# Patient Record
Sex: FEMALE | Race: WHITE | NOT HISPANIC OR LATINO
[De-identification: names, ages, dates, MRNs, and addresses within clinical notes are randomized per-mention and may not be internally consistent; named-entity substitution may affect disease eponyms.]

---

## 2017-01-06 ENCOUNTER — APPOINTMENT (OUTPATIENT)
Dept: OPHTHALMOLOGY | Facility: CLINIC | Age: 62
End: 2017-01-06

## 2017-01-06 DIAGNOSIS — H25.12 AGE-RELATED NUCLEAR CATARACT, LEFT EYE: ICD-10-CM

## 2017-01-06 DIAGNOSIS — H25.11 AGE-RELATED NUCLEAR CATARACT, RIGHT EYE: ICD-10-CM

## 2018-05-11 ENCOUNTER — APPOINTMENT (OUTPATIENT)
Dept: OPHTHALMOLOGY | Facility: CLINIC | Age: 63
End: 2018-05-11

## 2018-12-07 ENCOUNTER — APPOINTMENT (OUTPATIENT)
Dept: OPHTHALMOLOGY | Facility: CLINIC | Age: 63
End: 2018-12-07
Payer: COMMERCIAL

## 2018-12-07 DIAGNOSIS — H25.13 AGE-RELATED NUCLEAR CATARACT, BILATERAL: ICD-10-CM

## 2018-12-07 DIAGNOSIS — H01.00A UNSPECIFIED BLEPHARITIS RIGHT EYE, UPPER AND LOWER EYELIDS: ICD-10-CM

## 2018-12-07 DIAGNOSIS — H01.00B UNSPECIFIED BLEPHARITIS RIGHT EYE, UPPER AND LOWER EYELIDS: ICD-10-CM

## 2018-12-07 PROCEDURE — 92014 COMPRE OPH EXAM EST PT 1/>: CPT

## 2019-02-22 ENCOUNTER — APPOINTMENT (OUTPATIENT)
Dept: OTOLARYNGOLOGY | Facility: CLINIC | Age: 64
End: 2019-02-22
Payer: COMMERCIAL

## 2019-02-22 VITALS
SYSTOLIC BLOOD PRESSURE: 123 MMHG | HEART RATE: 69 BPM | DIASTOLIC BLOOD PRESSURE: 81 MMHG | HEIGHT: 65 IN | WEIGHT: 152 LBS | BODY MASS INDEX: 25.33 KG/M2

## 2019-02-22 PROCEDURE — 31231 NASAL ENDOSCOPY DX: CPT

## 2019-02-22 PROCEDURE — 99204 OFFICE O/P NEW MOD 45 MIN: CPT | Mod: 25

## 2019-02-22 NOTE — HISTORY OF PRESENT ILLNESS
[de-identified] : PAtient has had nasal congestion and some coughing for the last 11 days and she had random throat pain last week that was minor. She has mucus in the throat that is clear when she blows her nose the mucus is clear. She stopped using flonase a day ago. SHe had minor improvement with the mucus in the throat when she used the flonase. SHe does not have any issues with sinus infections recently but she did have sinus surgery years ago. The last CT of the sinuses was probably 2 years ago and she believes they have been fine in regards to the sinuses.

## 2019-02-22 NOTE — CONSULT LETTER
[Dear  ___] : Dear  [unfilled], [Consult Letter:] : I had the pleasure of evaluating your patient, [unfilled]. [Please see my note below.] : Please see my note below. [Consult Closing:] : Thank you very much for allowing me to participate in the care of this patient.  If you have any questions, please do not hesitate to contact me. [Sincerely,] : Sincerely, [FreeTextEntry3] : Silver Mcdermott MD\par [facility]\par Otolaryngology and Facial Plastics\par Associated Professor, [employer]\par

## 2019-02-22 NOTE — PHYSICAL EXAM
[Nasal Endoscopy Performed] : nasal endoscopy was performed, see procedure section for findings [] : septum deviated to the left [Midline] : trachea located in midline position [Normal] : no rashes [Removed] : palatine tonsils previously removed

## 2019-02-22 NOTE — ASSESSMENT
[FreeTextEntry1] : Patient history recurrent sinus issues operated in 2012 on the left side every February gets an upper respiratory tract infection that tract into [place] bronch[place] endosc[place] of her left maxillary sinuses and ethmoids a wide-open R. right unable to enter she does have a deviated septum a lot of secretions based on history we put on a Medrol Dosepak and Zithromax and sent her for a CAT scan to definitively evaluate her sinuses.

## 2019-03-12 ENCOUNTER — FORM ENCOUNTER (OUTPATIENT)
Age: 64
End: 2019-03-12

## 2019-03-13 ENCOUNTER — OUTPATIENT (OUTPATIENT)
Dept: OUTPATIENT SERVICES | Facility: HOSPITAL | Age: 64
LOS: 1 days | End: 2019-03-13
Payer: COMMERCIAL

## 2019-03-13 ENCOUNTER — APPOINTMENT (OUTPATIENT)
Dept: CT IMAGING | Facility: CLINIC | Age: 64
End: 2019-03-13
Payer: COMMERCIAL

## 2019-03-13 DIAGNOSIS — R09.81 NASAL CONGESTION: ICD-10-CM

## 2019-03-13 PROCEDURE — 70486 CT MAXILLOFACIAL W/O DYE: CPT

## 2019-03-13 PROCEDURE — 70486 CT MAXILLOFACIAL W/O DYE: CPT | Mod: 26

## 2019-03-20 ENCOUNTER — APPOINTMENT (OUTPATIENT)
Dept: OTOLARYNGOLOGY | Facility: CLINIC | Age: 64
End: 2019-03-20
Payer: COMMERCIAL

## 2019-03-20 VITALS
WEIGHT: 152 LBS | BODY MASS INDEX: 25.33 KG/M2 | HEART RATE: 60 BPM | HEIGHT: 65 IN | DIASTOLIC BLOOD PRESSURE: 89 MMHG | SYSTOLIC BLOOD PRESSURE: 143 MMHG

## 2019-03-20 DIAGNOSIS — Z87.891 PERSONAL HISTORY OF NICOTINE DEPENDENCE: ICD-10-CM

## 2019-03-20 PROCEDURE — 92567 TYMPANOMETRY: CPT

## 2019-03-20 PROCEDURE — 31231 NASAL ENDOSCOPY DX: CPT

## 2019-03-20 PROCEDURE — 92557 COMPREHENSIVE HEARING TEST: CPT

## 2019-03-20 PROCEDURE — 99214 OFFICE O/P EST MOD 30 MIN: CPT | Mod: 25

## 2019-03-20 RX ORDER — METHYLPREDNISOLONE 4 MG/1
4 TABLET ORAL
Qty: 21 | Refills: 0 | Status: COMPLETED | COMMUNITY
Start: 2019-02-22

## 2019-03-20 RX ORDER — LIDOCAINE 5% 700 MG/1
5 PATCH TOPICAL
Qty: 30 | Refills: 0 | Status: COMPLETED | COMMUNITY
Start: 2018-04-13

## 2019-03-20 RX ORDER — AMOXICILLIN 500 MG/1
500 CAPSULE ORAL
Qty: 20 | Refills: 0 | Status: COMPLETED | COMMUNITY
Start: 2019-02-21

## 2019-03-22 NOTE — HISTORY OF PRESENT ILLNESS
[de-identified] : 62 y/o F, treated for URI by Dr. Mcdermott for URI last month.  Notes symptoms resolved. \par Continues to have nasal congestion with PMD all year round.  D/c'ed Flonase 2 weeks ago.  Last night felt a sudden "pop" in her left ear.  Unsure if change in hearing, however constant "whooshing" noise.  No pain at the time, however about one hour ago started feeling pain in left side of jaw.  Feels not worsened with jaw opening.   No D/C from ear.  No vertigo.

## 2019-03-22 NOTE — REASON FOR VISIT
[Initial Consultation] : an initial consultation for [FreeTextEntry2] : Left ear discomfort and noise.

## 2019-03-22 NOTE — PROCEDURE
[FreeTextEntry6] : Afrin and lidocaine were topically sprayed. Flexible scope #2 was used. Right nasal passage with normal inferior, middle and superior turbinates. Nasal passage patent with clear middle meatus and sphenoethmoid recess. Left nasal passage with normal inferior, middle and superior turbinates. Nasal passage was patent with clear middle meatus with widely patent maxillary antrostomy without mucopurulence or edema and clear sphenoethmoid recess. No mucopurulence or polyps appreciated. Nasopharynx clear.

## 2019-03-22 NOTE — ASSESSMENT
[FreeTextEntry1] : mild left asymmetry and unilateral tinnitus:\par - obtain MRI IAC\par - will call with MRI results\par \par HTN\par - F/U with PMD

## 2019-05-28 ENCOUNTER — APPOINTMENT (OUTPATIENT)
Dept: MRI IMAGING | Facility: CLINIC | Age: 64
End: 2019-05-28

## 2019-06-16 ENCOUNTER — FORM ENCOUNTER (OUTPATIENT)
Age: 64
End: 2019-06-16

## 2019-06-17 ENCOUNTER — OUTPATIENT (OUTPATIENT)
Dept: OUTPATIENT SERVICES | Facility: HOSPITAL | Age: 64
LOS: 1 days | End: 2019-06-17
Payer: COMMERCIAL

## 2019-06-17 ENCOUNTER — APPOINTMENT (OUTPATIENT)
Dept: MRI IMAGING | Facility: CLINIC | Age: 64
End: 2019-06-17
Payer: COMMERCIAL

## 2019-06-17 DIAGNOSIS — H90.5 UNSPECIFIED SENSORINEURAL HEARING LOSS: ICD-10-CM

## 2019-06-17 PROCEDURE — 70553 MRI BRAIN STEM W/O & W/DYE: CPT | Mod: 26

## 2019-06-17 PROCEDURE — A9585: CPT

## 2019-06-17 PROCEDURE — 70553 MRI BRAIN STEM W/O & W/DYE: CPT

## 2019-06-20 ENCOUNTER — APPOINTMENT (OUTPATIENT)
Dept: OTOLARYNGOLOGY | Facility: CLINIC | Age: 64
End: 2019-06-20
Payer: COMMERCIAL

## 2019-06-20 VITALS
DIASTOLIC BLOOD PRESSURE: 82 MMHG | WEIGHT: 159 LBS | HEART RATE: 72 BPM | BODY MASS INDEX: 26.49 KG/M2 | SYSTOLIC BLOOD PRESSURE: 135 MMHG | HEIGHT: 65 IN

## 2019-06-20 DIAGNOSIS — H91.8X2 OTHER SPECIFIED HEARING LOSS, LEFT EAR: ICD-10-CM

## 2019-06-20 DIAGNOSIS — H93.12 TINNITUS, LEFT EAR: ICD-10-CM

## 2019-06-20 DIAGNOSIS — J34.2 DEVIATED NASAL SEPTUM: ICD-10-CM

## 2019-06-20 DIAGNOSIS — R09.81 NASAL CONGESTION: ICD-10-CM

## 2019-06-20 DIAGNOSIS — H90.5 UNSPECIFIED SENSORINEURAL HEARING LOSS: ICD-10-CM

## 2019-06-20 PROCEDURE — 99214 OFFICE O/P EST MOD 30 MIN: CPT

## 2019-06-20 NOTE — REASON FOR VISIT
[Subsequent Evaluation] : a subsequent evaluation for [Nasal Obstruction] : nasal obstruction [Ear Pain] : ear pain [FreeTextEntry2] : Right  ear discomfort andleft hearing loss

## 2019-06-20 NOTE — PHYSICAL EXAM
[de-identified] : bilat retraction [de-identified] : congested [Midline] : trachea located in midline position [Normal] : no rashes

## 2019-06-20 NOTE — HISTORY OF PRESENT ILLNESS
[de-identified] : 65 y/o\par Last seen by Dr Gan\par MRI-left ear abn\par Pt now notes increased nasal congestion and Right ear fullness\par Still on Flonase\par , treated for URI by Dr. Mcdermott for URI last month.  Notes symptoms resolved. \par Continues to have nasal congestion with PMD all year round.  h/o- Last night felt a sudden "pop" in her left ear.  Unsure if change in hearing, however constant "whooshing" noise.  No pain at the time, however about one hour ago started feeling pain in left side of jaw.  Feels not worsened with jaw opening.   No D/C from ear.  No vertigo.

## 2019-06-20 NOTE — ASSESSMENT
[FreeTextEntry1] : \par DNS and Rhinitis\par Rx\par   Steam humidification and hypertonic saline nasal irrigations \par rx-medrol dospak\par \par mild left asymmetry and unilateral tinnitus:\par f/u audio in 6 months poss repeat MRI

## 2020-01-30 ENCOUNTER — APPOINTMENT (OUTPATIENT)
Dept: OPHTHALMOLOGY | Facility: CLINIC | Age: 65
End: 2020-01-30
Payer: COMMERCIAL

## 2020-01-30 ENCOUNTER — NON-APPOINTMENT (OUTPATIENT)
Age: 65
End: 2020-01-30

## 2020-01-30 PROCEDURE — 92014 COMPRE OPH EXAM EST PT 1/>: CPT

## 2020-01-30 PROCEDURE — 92134 CPTRZ OPH DX IMG PST SGM RTA: CPT

## 2020-02-28 ENCOUNTER — NON-APPOINTMENT (OUTPATIENT)
Age: 65
End: 2020-02-28

## 2020-02-28 ENCOUNTER — APPOINTMENT (OUTPATIENT)
Dept: OPHTHALMOLOGY | Facility: CLINIC | Age: 65
End: 2020-02-28
Payer: COMMERCIAL

## 2020-02-28 PROCEDURE — 92012 INTRM OPH EXAM EST PATIENT: CPT

## 2020-06-30 ENCOUNTER — APPOINTMENT (OUTPATIENT)
Dept: PULMONOLOGY | Facility: CLINIC | Age: 65
End: 2020-06-30
Payer: COMMERCIAL

## 2020-06-30 VITALS
HEART RATE: 70 BPM | RESPIRATION RATE: 16 BRPM | OXYGEN SATURATION: 96 % | SYSTOLIC BLOOD PRESSURE: 113 MMHG | TEMPERATURE: 98.2 F | DIASTOLIC BLOOD PRESSURE: 80 MMHG

## 2020-06-30 DIAGNOSIS — Z80.1 FAMILY HISTORY OF MALIGNANT NEOPLASM OF TRACHEA, BRONCHUS AND LUNG: ICD-10-CM

## 2020-06-30 PROCEDURE — 99204 OFFICE O/P NEW MOD 45 MIN: CPT

## 2020-06-30 RX ORDER — IBUPROFEN 800 MG/1
800 TABLET ORAL
Qty: 20 | Refills: 0 | Status: DISCONTINUED | COMMUNITY
Start: 2018-09-12 | End: 2020-06-30

## 2020-06-30 RX ORDER — DENOSUMAB 60 MG/ML
60 INJECTION SUBCUTANEOUS
Qty: 1 | Refills: 0 | Status: DISCONTINUED | COMMUNITY
Start: 2018-11-29 | End: 2020-06-30

## 2020-06-30 RX ORDER — BUTALBITAL, ACETAMINOPHEN AND CAFFEINE 325; 50; 40 MG/1; MG/1; MG/1
50-325-40 TABLET ORAL
Qty: 120 | Refills: 0 | Status: DISCONTINUED | COMMUNITY
Start: 2018-09-20 | End: 2020-06-30

## 2020-06-30 NOTE — PROCEDURE
[FreeTextEntry1] : CT Chest from [facility] march 2020 reviewed--4-5mm nodules.

## 2020-06-30 NOTE — PHYSICAL EXAM
[No Acute Distress] : no acute distress [Normal Appearance] : normal appearance [Normal S1, S2] : normal s1, s2 [Clear to Auscultation Bilaterally] : clear to auscultation bilaterally [No Resp Distress] : no resp distress [No Clubbing] : no clubbing [No Cyanosis] : no cyanosis

## 2020-06-30 NOTE — CONSULT LETTER
[FreeTextEntry1] : Dear ,\par \par I had the pleasure of evaluating your patient, KAYLA LOPEZ today in pulmonary consultation.  Please refer to my attached note for my findings and recommendations.\par \par \par Thank you for allowing me to participate in the care of your patient, please feel free to call with any questions or concerns.\par \par \par Sincerely,\par \par Ira Partida MD\par [facility] \par [facility]\par \par

## 2020-06-30 NOTE — HISTORY OF PRESENT ILLNESS
[Former] : former [TextBox_4] : 65F with cough for past 2 weeks.  Typically will get a productive cough once or twice per year, usually relieved with advair and a course of steroid.  No formal diagnosis of asthma, notes seasonal allergies.  Went to urgent care and was given a medrol pack which helped for one to two days but cough returned, keeping her up at night, feels like she cannot take a deep breath.  No fever/chills/sick contacts/travel.  History of sinus disease, was told lung issues would resolve if sinus disease was better controlled, unknown if she has nasal polyps.  Had covid swab which was negative.   CT Chest in March at [facility] found 2 4-5mm nodules, no other abnormality.

## 2020-06-30 NOTE — REVIEW OF SYSTEMS
[Nasal Congestion] : nasal congestion [Postnasal Drip] : postnasal drip [Cough] : cough [Sputum] : sputum [Dyspnea] : dyspnea [Wheezing] : wheezing [Hay Fever] : hay fever [Negative] : Cardiovascular

## 2020-08-04 ENCOUNTER — APPOINTMENT (OUTPATIENT)
Dept: PULMONOLOGY | Facility: CLINIC | Age: 65
End: 2020-08-04

## 2020-08-05 LAB — SARS-COV-2 N GENE NPH QL NAA+PROBE: NOT DETECTED

## 2020-08-06 ENCOUNTER — APPOINTMENT (OUTPATIENT)
Dept: PULMONOLOGY | Facility: CLINIC | Age: 65
End: 2020-08-06
Payer: COMMERCIAL

## 2020-08-06 VITALS
HEIGHT: 65 IN | DIASTOLIC BLOOD PRESSURE: 70 MMHG | BODY MASS INDEX: 28.32 KG/M2 | TEMPERATURE: 97.6 F | WEIGHT: 170 LBS | RESPIRATION RATE: 16 BRPM | OXYGEN SATURATION: 7 % | HEART RATE: 68 BPM | SYSTOLIC BLOOD PRESSURE: 103 MMHG

## 2020-08-06 LAB — POCT - HEMOGLOBIN (HGB), QUANTITATIVE, TRANSCUTANEOUS: 13.2

## 2020-08-06 PROCEDURE — 94729 DIFFUSING CAPACITY: CPT

## 2020-08-06 PROCEDURE — 88738 HGB QUANT TRANSCUTANEOUS: CPT

## 2020-08-06 PROCEDURE — 94060 EVALUATION OF WHEEZING: CPT

## 2020-08-06 PROCEDURE — 99213 OFFICE O/P EST LOW 20 MIN: CPT | Mod: 25

## 2020-08-06 PROCEDURE — 94727 GAS DIL/WSHOT DETER LNG VOL: CPT

## 2020-08-06 NOTE — HISTORY OF PRESENT ILLNESS
[Former] : former [TextBox_4] : still with cough and sputum production\par saw ENT--has a cyst on vocal cord?\par was put on augmentin x 10 days and prednisone by ENT but still coughing.\par worst on hot/humid days\par when weather is nice no coughing\par albuterol helps\par taking singulair/claritin right now

## 2020-08-06 NOTE — PROCEDURE
[FreeTextEntry1] : PFT: Normal spirometry with a significant bronchodilator response.  Lung volumes normal. DLCO normal.\par \par \par Prior exam reviewed:\par CT Chest from [facility] march 2020 reviewed--4-5mm nodules.

## 2020-08-06 NOTE — PHYSICAL EXAM
[Normal Appearance] : normal appearance [No Acute Distress] : no acute distress [Normal S1, S2] : normal s1, s2 [No Resp Distress] : no resp distress [Clear to Auscultation Bilaterally] : clear to auscultation bilaterally [No Clubbing] : no clubbing [No Cyanosis] : no cyanosis

## 2020-08-07 ENCOUNTER — APPOINTMENT (OUTPATIENT)
Dept: PULMONOLOGY | Facility: CLINIC | Age: 65
End: 2020-08-07

## 2020-09-10 ENCOUNTER — APPOINTMENT (OUTPATIENT)
Dept: PULMONOLOGY | Facility: CLINIC | Age: 65
End: 2020-09-10
Payer: COMMERCIAL

## 2020-09-10 VITALS
SYSTOLIC BLOOD PRESSURE: 116 MMHG | TEMPERATURE: 97.8 F | DIASTOLIC BLOOD PRESSURE: 83 MMHG | HEART RATE: 63 BPM | OXYGEN SATURATION: 94 %

## 2020-09-10 DIAGNOSIS — R07.89 OTHER CHEST PAIN: ICD-10-CM

## 2020-09-10 DIAGNOSIS — Z23 ENCOUNTER FOR IMMUNIZATION: ICD-10-CM

## 2020-09-10 PROCEDURE — 99214 OFFICE O/P EST MOD 30 MIN: CPT | Mod: 25

## 2020-09-10 PROCEDURE — G0008: CPT

## 2020-09-10 PROCEDURE — 90686 IIV4 VACC NO PRSV 0.5 ML IM: CPT

## 2020-09-10 PROCEDURE — 71046 X-RAY EXAM CHEST 2 VIEWS: CPT

## 2020-09-10 PROCEDURE — 36415 COLL VENOUS BLD VENIPUNCTURE: CPT

## 2020-09-10 NOTE — PROCEDURE
[FreeTextEntry1] : CXR: clear lungs, no focal consolidation or pleural effusions, cardiac silhouette appears normal.  No bony abnormality.\par \par Prior exam reviewed:\par PFT: Normal spirometry with a significant bronchodilator response.  Lung volumes normal. DLCO normal.\par \par \par \par CT Chest from [facility] march 2020 reviewed--4-5mm nodules.

## 2020-09-10 NOTE — HISTORY OF PRESENT ILLNESS
[TextBox_4] : doing well on singulair and breo\par cough improved\par still has days when wakes up coughing but then it subsides after 30 min\par \par has been feeling right sided pain in her chest upper outer right chest.  recent mammo was normal.  hurts with palpation and with deep breathing.  denies dyspnea, palpitations.

## 2020-09-10 NOTE — PHYSICAL EXAM
[Normal S1, S2] : normal s1, s2 [No Acute Distress] : no acute distress [Normal Appearance] : normal appearance [No Resp Distress] : no resp distress [Clear to Auscultation Bilaterally] : clear to auscultation bilaterally [No Clubbing] : no clubbing [No Cyanosis] : no cyanosis

## 2020-09-11 LAB — DEPRECATED D DIMER PPP IA-ACNC: <150 NG/ML DDU

## 2020-09-28 ENCOUNTER — RX RENEWAL (OUTPATIENT)
Age: 65
End: 2020-09-28

## 2020-12-23 ENCOUNTER — APPOINTMENT (OUTPATIENT)
Dept: PULMONOLOGY | Facility: CLINIC | Age: 65
End: 2020-12-23
Payer: COMMERCIAL

## 2020-12-23 VITALS
TEMPERATURE: 98 F | SYSTOLIC BLOOD PRESSURE: 124 MMHG | WEIGHT: 171 LBS | HEIGHT: 65 IN | OXYGEN SATURATION: 97 % | HEART RATE: 69 BPM | BODY MASS INDEX: 28.49 KG/M2 | DIASTOLIC BLOOD PRESSURE: 83 MMHG

## 2020-12-23 PROCEDURE — 99072 ADDL SUPL MATRL&STAF TM PHE: CPT

## 2020-12-23 PROCEDURE — 99214 OFFICE O/P EST MOD 30 MIN: CPT | Mod: 25

## 2020-12-23 PROCEDURE — 71046 X-RAY EXAM CHEST 2 VIEWS: CPT

## 2020-12-23 PROCEDURE — 36415 COLL VENOUS BLD VENIPUNCTURE: CPT

## 2020-12-23 NOTE — REVIEW OF SYSTEMS
[Nasal Congestion] : nasal congestion [Postnasal Drip] : postnasal drip [Cough] : cough [Sputum] : sputum [Dyspnea] : dyspnea [Negative] : Allergy/Immunology

## 2020-12-23 NOTE — HISTORY OF PRESENT ILLNESS
[Never] : never [TextBox_4] : cold in november from grandchild\par coughing since\par was off breo/singulair--just restarted this week\par a lot of sinus congestion/thick mucous/pnd\par ent wants her to have MRI\par \par has pulm nodules that are due for fu in feb

## 2020-12-23 NOTE — PHYSICAL EXAM
[No Acute Distress] : no acute distress [Normal Appearance] : normal appearance [Normal S1, S2] : normal s1, s2 [No Resp Distress] : no resp distress [Clear to Auscultation Bilaterally] : clear to auscultation bilaterally [No Abnormalities] : no abnormalities [No Clubbing] : no clubbing [No Cyanosis] : no cyanosis

## 2020-12-23 NOTE — ASSESSMENT
[FreeTextEntry1] : did she have covid in november?  check abs\par cont inhalers\par augmentin for possible sinus infection\par cont nasal sprays\par needs mri per ENT \par \par needs follow up ct chest for nodules in feb--will call me when shes ready to go for this.

## 2020-12-24 LAB
SARS-COV-2 IGG SERPL IA-ACNC: 0.06 INDEX
SARS-COV-2 IGG SERPL QL IA: NEGATIVE

## 2021-01-11 ENCOUNTER — APPOINTMENT (OUTPATIENT)
Dept: MRI IMAGING | Facility: CLINIC | Age: 66
End: 2021-01-11
Payer: COMMERCIAL

## 2021-01-11 ENCOUNTER — RESULT REVIEW (OUTPATIENT)
Age: 66
End: 2021-01-11

## 2021-01-11 ENCOUNTER — OUTPATIENT (OUTPATIENT)
Dept: OUTPATIENT SERVICES | Facility: HOSPITAL | Age: 66
LOS: 1 days | End: 2021-01-11
Payer: COMMERCIAL

## 2021-01-11 DIAGNOSIS — J38.7 OTHER DISEASES OF LARYNX: ICD-10-CM

## 2021-01-11 DIAGNOSIS — Z00.8 ENCOUNTER FOR OTHER GENERAL EXAMINATION: ICD-10-CM

## 2021-01-11 PROCEDURE — 70543 MRI ORBT/FAC/NCK W/O &W/DYE: CPT | Mod: 26

## 2021-01-11 PROCEDURE — 70543 MRI ORBT/FAC/NCK W/O &W/DYE: CPT

## 2021-01-11 PROCEDURE — A9585: CPT

## 2021-01-14 DIAGNOSIS — R91.8 OTHER NONSPECIFIC ABNORMAL FINDING OF LUNG FIELD: ICD-10-CM

## 2021-01-20 ENCOUNTER — NON-APPOINTMENT (OUTPATIENT)
Age: 66
End: 2021-01-20

## 2021-01-22 ENCOUNTER — TRANSCRIPTION ENCOUNTER (OUTPATIENT)
Age: 66
End: 2021-01-22

## 2021-01-22 ENCOUNTER — APPOINTMENT (OUTPATIENT)
Dept: OTOLARYNGOLOGY | Facility: CLINIC | Age: 66
End: 2021-01-22
Payer: COMMERCIAL

## 2021-01-22 PROCEDURE — 99442: CPT

## 2021-01-22 NOTE — HISTORY OF PRESENT ILLNESS
[de-identified] : Visit initiated at patient request.  This Telephone visit is occurring at the patient's request.  There are limitations of telemedicine encounters including the risks associated with the technology platform and lack of a physical exam.  The patient may need further testing and work up to arrive at a diagnosis and treatment plan.  The patient was made aware when the appointment was scheduled that this will be billed as a visit.  The patient understood and elected to proceed.\par \par KAYLA LOPEZ is a 65 year patient With a long history of chronic rhinitis, chronic sinusitis, and reflux. I have not seen her in some time. She is calling for an opinion on management of a vallecular cyst. She has been seeing an ENT closer to home since she retired. She said that she was treated twice over the past few months with prednisone and inhalers for recurrent sinus and pulmonary infections. She was also treated the last time with antibiotics. She said she had a CT scan of the neck in August. She said there was a vallecular cyst by report. She had a follow up MRI with contrast this month which showed findings consistent with a vallecular cyst. She has intermittent nasal congestion, nasal drainage, postnasal drip, dry mouth, and cough in the morning with phlegm. She has no throat pain or dysphagia. Her ENT had recommended possible biopsy to rule out malignancy. She sought a second opinion with another ENT.  She said he agreed it looked like a vallecular cyst but that biopsy may not be necessary. I was able to review the MRI report. I also reviewed her prior records available in her chart.

## 2021-01-22 NOTE — ASSESSMENT
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 She suffers from reflux and chronic rhinitis. Her throat symptoms may be due to reflux as well as her chronic sinus disease.\par \par Plan\par -Findings and management options were discussed with the patient. A total time of 18 minutes was spent during the visit with more than 50% spent discussing management options\par -Continue with treatment for reflux and chronic sinus disease\par -I discussed management options for vallecular cyst. Although by her report in the MRI findings, it is consistent with vallecular cyst which are not malignant, the only way to rule out malignancy completely would be to perform a biopsy. Her alternative to biopsy would be observation with serial exams and possibly a followup scan in the future. She said there were no changes from the findings on the CT scan to the MRI. She could consider an in-office biopsy. If she is interested in this, I can refer her to my colleague, Dr. Saenz, who performs the procedure. I also told her it is difficult for me to give her advice without performing a physical exam. I am going by what she reports and what the MRI reported. I did recommend that she come in for a visit for a complete evaluation. I also discussed some risks of observation which include malignancy and sudden enlargement or infection of the cyst with airway compromise. \par -She is going to consider her options. I asked her to call me if she has any concerns or questions.

## 2021-01-28 ENCOUNTER — RX RENEWAL (OUTPATIENT)
Age: 66
End: 2021-01-28

## 2021-02-09 ENCOUNTER — APPOINTMENT (OUTPATIENT)
Dept: CT IMAGING | Facility: CLINIC | Age: 66
End: 2021-02-09
Payer: COMMERCIAL

## 2021-02-09 ENCOUNTER — OUTPATIENT (OUTPATIENT)
Dept: OUTPATIENT SERVICES | Facility: HOSPITAL | Age: 66
LOS: 1 days | End: 2021-02-09
Payer: COMMERCIAL

## 2021-02-09 DIAGNOSIS — R91.8 OTHER NONSPECIFIC ABNORMAL FINDING OF LUNG FIELD: ICD-10-CM

## 2021-02-09 PROCEDURE — 71250 CT THORAX DX C-: CPT

## 2021-02-09 PROCEDURE — 71250 CT THORAX DX C-: CPT | Mod: 26

## 2021-02-17 ENCOUNTER — APPOINTMENT (OUTPATIENT)
Dept: PULMONOLOGY | Facility: CLINIC | Age: 66
End: 2021-02-17
Payer: COMMERCIAL

## 2021-02-17 VITALS
HEART RATE: 73 BPM | OXYGEN SATURATION: 97 % | TEMPERATURE: 97.3 F | SYSTOLIC BLOOD PRESSURE: 131 MMHG | DIASTOLIC BLOOD PRESSURE: 80 MMHG

## 2021-02-17 PROCEDURE — 99072 ADDL SUPL MATRL&STAF TM PHE: CPT

## 2021-02-17 PROCEDURE — 99214 OFFICE O/P EST MOD 30 MIN: CPT

## 2021-02-17 NOTE — PHYSICAL EXAM
[No Acute Distress] : no acute distress [No Resp Distress] : no resp distress [Clear to Auscultation Bilaterally] : clear to auscultation bilaterally

## 2021-02-17 NOTE — ASSESSMENT
[FreeTextEntry1] : cont breo and singulair\par \par fu with ENT for biopsy\par \par no further ct scans required at this time\par \par fu after ent appt

## 2021-02-17 NOTE — PROCEDURE
[FreeTextEntry1] : Reviewed:\par \par \par EXAM: CT CHEST\par \par \par PROCEDURE DATE: 02/09/2021\par \par \par \par INTERPRETATION: Clinical information: Pulmonary nodule. Exam is compared to previous study of 8/12/2011.\par \par CT scan of the chest was obtained without administration of intravenous contrast.\par \par No hilar and/or mediastinal adenopathy is noted.\par \par Heart is normal in size. Calcification the coronary arteries noted. No pericardial effusion is noted.\par \par No endobronchial lesions are noted. Calcified nodules are noted in the left upper and left lower lobes. They are unchanged when compared to previous exam. Remaining lungs are clear. No pleural effusions are noted.\par \par Below the diaphragm, visualized portions of the abdomen are unremarkable.\par \par Degenerative changes of the spine are noted.\par \par Impression: No noncalcified pulmonary nodules are noted.\par \par

## 2021-02-17 NOTE — HISTORY OF PRESENT ILLNESS
[TextBox_4] : on breo/singulair\par cough improved\par saw ENT--needs biopsy\par \par had ct chest--no concerning findings

## 2021-03-26 ENCOUNTER — APPOINTMENT (OUTPATIENT)
Dept: OTOLARYNGOLOGY | Facility: CLINIC | Age: 66
End: 2021-03-26
Payer: COMMERCIAL

## 2021-03-26 PROCEDURE — 31575 DIAGNOSTIC LARYNGOSCOPY: CPT

## 2021-03-26 PROCEDURE — 99072 ADDL SUPL MATRL&STAF TM PHE: CPT

## 2021-03-26 PROCEDURE — 99214 OFFICE O/P EST MOD 30 MIN: CPT | Mod: 25

## 2021-03-26 RX ORDER — ALBUTEROL SULFATE 2.5 MG/3ML
(2.5 MG/3ML) SOLUTION RESPIRATORY (INHALATION) 4 TIMES DAILY
Qty: 1 | Refills: 5 | Status: DISCONTINUED | COMMUNITY
Start: 2020-07-06 | End: 2021-03-26

## 2021-03-26 RX ORDER — AMOXICILLIN AND CLAVULANATE POTASSIUM 875; 125 MG/1; MG/1
875-125 TABLET, COATED ORAL
Qty: 14 | Refills: 0 | Status: DISCONTINUED | COMMUNITY
Start: 2020-12-23 | End: 2021-03-26

## 2021-03-26 RX ORDER — PREDNISONE 10 MG/1
10 TABLET ORAL
Qty: 30 | Refills: 1 | Status: DISCONTINUED | COMMUNITY
Start: 2020-12-29 | End: 2021-03-26

## 2021-03-26 RX ORDER — PREDNISONE 10 MG/1
10 TABLET ORAL
Qty: 30 | Refills: 1 | Status: DISCONTINUED | COMMUNITY
Start: 2020-06-30 | End: 2021-03-26

## 2021-03-26 NOTE — ASSESSMENT
[FreeTextEntry1] : She has a history of reflux and chronic rhinitis. Her reflux has not been well-controlled. She does have reflux-related laryngeal change on flexible laryngoscopy. She also has a history of a vallecular cyst. On exam today, there was an area on the lateral pharyngeal wall on the right side which could represent a cyst. Otherwise, there was no obvious mass. I have reviewed her MRI.\par \par Plan\par -Findings and management options were discussed with the patient.\par -Continue reflux precautions and medication for reflux. GI follow up recommended\par -Nasal saline irrigation, nasal steroid spray, and antihistamines as needed. I will give her Astelin to try\par -We again discussed management options of vallecular cyst. This is likely a benign lesion. The risks of malignancy is low but the only way to rule it out is with biopsy. Her options are observation versus biopsy. We discussed whether or not the biopsy could be performed in the office. The risks of biopsy include anesthesia consultations, bleeding, infection, recurrence, and nondiagnostic specimen. She is not sure that she wishes to have surgery in the operating room. I am going to have the MRI reviewed and speak with the radiologist to better see where the lesion is.  I am also going to reach out to her other ENT doctor to discuss what he saw on his exam as he had been following her for a while for this.\par -I will speak with her next week.

## 2021-03-26 NOTE — CONSULT LETTER
[Dear  ___] : Dear  [unfilled], [Courtesy Letter:] : I had the pleasure of seeing your patient, [unfilled], in my office today. [Please see my note below.] : Please see my note below. [Consult Closing:] : Thank you very much for allowing me to participate in the care of this patient.  If you have any questions, please do not hesitate to contact me. [Sincerely,] : Sincerely, [FreeTextEntry3] : Neyda You MD\par

## 2021-03-26 NOTE — HISTORY OF PRESENT ILLNESS
[de-identified] : KAYLA LOPEZ is a 65 year patient Who is well-known to me. She has a history of chronic rhinitis, chronic sinusitis, and reflux. I had a telemedicine appointment with her in January. She has been followed by an ENT physician in [place] for a vallecular cyst. She had a CT scan of the neck in August 2020 and a followup MRI with contrast in January. The findings were consistent with a vallecular cyst. She does have reflux-related symptoms but no throat pain or dysphagia. She does have nasal congestion, postnasal drip, and mild cough. She is also seeing the pulmonologist. She is using Protonix and Pepcid for reflux but her reflux could be better controlled. She had been considering biopsy of the vallecular cyst.

## 2021-03-30 ENCOUNTER — NON-APPOINTMENT (OUTPATIENT)
Age: 66
End: 2021-03-30

## 2021-03-30 ENCOUNTER — APPOINTMENT (OUTPATIENT)
Dept: OPHTHALMOLOGY | Facility: CLINIC | Age: 66
End: 2021-03-30
Payer: COMMERCIAL

## 2021-03-30 PROCEDURE — 92014 COMPRE OPH EXAM EST PT 1/>: CPT

## 2021-03-30 PROCEDURE — 99072 ADDL SUPL MATRL&STAF TM PHE: CPT

## 2021-04-26 ENCOUNTER — RX RENEWAL (OUTPATIENT)
Age: 66
End: 2021-04-26

## 2021-07-19 ENCOUNTER — RX RENEWAL (OUTPATIENT)
Age: 66
End: 2021-07-19

## 2021-07-30 ENCOUNTER — APPOINTMENT (OUTPATIENT)
Dept: MAMMOGRAPHY | Facility: CLINIC | Age: 66
End: 2021-07-30
Payer: COMMERCIAL

## 2021-07-30 ENCOUNTER — APPOINTMENT (OUTPATIENT)
Dept: ULTRASOUND IMAGING | Facility: CLINIC | Age: 66
End: 2021-07-30
Payer: COMMERCIAL

## 2021-07-30 PROCEDURE — 77067 SCR MAMMO BI INCL CAD: CPT

## 2021-07-30 PROCEDURE — 76641 ULTRASOUND BREAST COMPLETE: CPT | Mod: RT

## 2021-07-30 PROCEDURE — 77063 BREAST TOMOSYNTHESIS BI: CPT

## 2021-09-16 ENCOUNTER — RX RENEWAL (OUTPATIENT)
Age: 66
End: 2021-09-16

## 2021-10-20 ENCOUNTER — APPOINTMENT (OUTPATIENT)
Dept: PULMONOLOGY | Facility: CLINIC | Age: 66
End: 2021-10-20
Payer: COMMERCIAL

## 2021-10-20 VITALS
HEIGHT: 65 IN | SYSTOLIC BLOOD PRESSURE: 120 MMHG | OXYGEN SATURATION: 97 % | BODY MASS INDEX: 24.99 KG/M2 | WEIGHT: 150 LBS | HEART RATE: 64 BPM | TEMPERATURE: 98.1 F | DIASTOLIC BLOOD PRESSURE: 82 MMHG

## 2021-10-20 PROCEDURE — 99214 OFFICE O/P EST MOD 30 MIN: CPT

## 2021-10-20 NOTE — ASSESSMENT
[FreeTextEntry1] : cont breo and singulair\par no further ct chest fu required at this time\par fu with ent\par fu for pft

## 2021-10-20 NOTE — HISTORY OF PRESENT ILLNESS
[TextBox_4] : asthma on breo and singulair\par well controlled\par followed by ENT for abnormal growth that is being watched by serial MRI

## 2022-01-20 ENCOUNTER — RX RENEWAL (OUTPATIENT)
Age: 67
End: 2022-01-20

## 2022-01-20 ENCOUNTER — APPOINTMENT (OUTPATIENT)
Dept: MRI IMAGING | Facility: CLINIC | Age: 67
End: 2022-01-20

## 2022-02-14 ENCOUNTER — RX RENEWAL (OUTPATIENT)
Age: 67
End: 2022-02-14

## 2022-02-16 ENCOUNTER — RX RENEWAL (OUTPATIENT)
Age: 67
End: 2022-02-16

## 2022-02-18 ENCOUNTER — NON-APPOINTMENT (OUTPATIENT)
Age: 67
End: 2022-02-18

## 2022-02-18 ENCOUNTER — APPOINTMENT (OUTPATIENT)
Dept: OPHTHALMOLOGY | Facility: CLINIC | Age: 67
End: 2022-02-18
Payer: COMMERCIAL

## 2022-02-18 PROCEDURE — 92014 COMPRE OPH EXAM EST PT 1/>: CPT

## 2022-02-28 ENCOUNTER — APPOINTMENT (OUTPATIENT)
Dept: MRI IMAGING | Facility: CLINIC | Age: 67
End: 2022-02-28
Payer: COMMERCIAL

## 2022-02-28 ENCOUNTER — OUTPATIENT (OUTPATIENT)
Dept: OUTPATIENT SERVICES | Facility: HOSPITAL | Age: 67
LOS: 1 days | End: 2022-02-28
Payer: COMMERCIAL

## 2022-02-28 DIAGNOSIS — Z00.8 ENCOUNTER FOR OTHER GENERAL EXAMINATION: ICD-10-CM

## 2022-02-28 PROCEDURE — 70542 MRI ORBIT/FACE/NECK W/DYE: CPT | Mod: 26

## 2022-02-28 PROCEDURE — A9585: CPT

## 2022-02-28 PROCEDURE — 70542 MRI ORBIT/FACE/NECK W/DYE: CPT

## 2022-04-01 ENCOUNTER — APPOINTMENT (OUTPATIENT)
Dept: OTOLARYNGOLOGY | Facility: CLINIC | Age: 67
End: 2022-04-01
Payer: COMMERCIAL

## 2022-04-01 VITALS — HEIGHT: 65 IN | TEMPERATURE: 97.2 F | WEIGHT: 153 LBS | BODY MASS INDEX: 25.49 KG/M2

## 2022-04-01 PROCEDURE — 99213 OFFICE O/P EST LOW 20 MIN: CPT | Mod: 25

## 2022-04-01 PROCEDURE — 31575 DIAGNOSTIC LARYNGOSCOPY: CPT

## 2022-04-01 NOTE — HISTORY OF PRESENT ILLNESS
No [de-identified] : KAYLA LOPEZ is a 66 year old patient with a history of chronic rhinitis, chronic sinusitis, reflux, and vallecular cyst.  I saw her approximately 1 year ago for evaluation.  She had a benign-appearing cyst seen on CT scan of the neck and MRI.  It was not visualized on flexible laryngoscopy.  She opted for observation.  She has been seeing Dr. Mcdowell, an ENT physician, on [address] for follow-up.  She had a repeat MRI recently.  It showed no change in the vallecular cyst.  There was probable trapped proteinaceous or fatty material in the right lateral pharyngeal recess.  they had discussed biopsy.  She has no throat pain, dysphagia, or voice change.  She does have a nasal congestion and postnasal drip which is likely due to reflux and chronic rhinitis.  She is here for another opinion\par \par ENT history\par CT scan of the neck August 2020\par MRI with contrast of the neck January 2021-right-sided 8 mm vallecular retention cyst\par MRI with contrast of the neck March 2022–stable finding consistent with a right-sided 8 mm vallecular retention cyst.  Probable trapped proteinaceous or fatty material within the right lateral pharyngeal recess\par She had sinus surgery in the past

## 2022-04-01 NOTE — ASSESSMENT
[FreeTextEntry1] : She has a postnasal drip and nasal congestion due to chronic rhinitis and reflux.  She had reflux related laryngeal changes on flexible laryngoscopy which remain unchanged\par \par She had a repeat MRI which showed a stable right vallecular cyst.  On flexible laryngoscopy, there was no change in her exam.  There was a possible cyst seen on the right side although based on the description from the radiologist it may be more lateral.  The nasopharynx on the right side was carefully evaluated.  No masses or lesions were seen.  The MRI findings were likely due to secretions.  I reviewed the MRI findings with my colleague Dr. Terry as well.  He was also available during the nasal endoscopy/flexible laryngoscopy.  He agrees with the assessment. I also reviewed her prior MRI. \par \par \par Plan\par -Findings and management options were discussed with the patient.\par -Continue reflux precautions and medication for reflux. GI follow up again recommended as she may need repeat upper endoscopy\par -Nasal saline irrigation, nasal steroid spray, and antihistamines as needed.  \par -We again discussed management options.  I did not see anything obvious to biopsy in the nasopharynx.  She could consider exam under anesthesia if she wishes.  Follow-up imaging study was also given to her as an option.  At this point, she is going to observe it.  I will see her back in 3 months if she is doing well.  If she has any concerns or changes in her symptoms, she should return earlier.

## 2022-04-11 ENCOUNTER — OUTPATIENT (OUTPATIENT)
Dept: OUTPATIENT SERVICES | Facility: HOSPITAL | Age: 67
LOS: 1 days | Discharge: HOME | End: 2022-04-11

## 2022-05-25 ENCOUNTER — NON-APPOINTMENT (OUTPATIENT)
Age: 67
End: 2022-05-25

## 2022-05-26 ENCOUNTER — APPOINTMENT (OUTPATIENT)
Dept: ORTHOPEDIC SURGERY | Facility: CLINIC | Age: 67
End: 2022-05-26
Payer: COMMERCIAL

## 2022-05-26 VITALS — BODY MASS INDEX: 25.49 KG/M2 | HEIGHT: 65 IN | WEIGHT: 153 LBS

## 2022-05-26 PROCEDURE — 99204 OFFICE O/P NEW MOD 45 MIN: CPT

## 2022-06-07 ENCOUNTER — OUTPATIENT (OUTPATIENT)
Dept: OUTPATIENT SERVICES | Facility: HOSPITAL | Age: 67
LOS: 1 days | End: 2022-06-07
Payer: COMMERCIAL

## 2022-06-07 ENCOUNTER — APPOINTMENT (OUTPATIENT)
Dept: ANESTHESIOLOGY | Facility: CLINIC | Age: 67
End: 2022-06-07

## 2022-06-07 DIAGNOSIS — M54.16 RADICULOPATHY, LUMBAR REGION: ICD-10-CM

## 2022-06-07 PROCEDURE — 64483 NJX AA&/STRD TFRM EPI L/S 1: CPT

## 2022-06-07 PROCEDURE — 64484 NJX AA&/STRD TFRM EPI L/S EA: CPT

## 2022-06-10 ENCOUNTER — APPOINTMENT (OUTPATIENT)
Dept: ORTHOPEDIC SURGERY | Facility: CLINIC | Age: 67
End: 2022-06-10
Payer: COMMERCIAL

## 2022-06-10 VITALS
HEIGHT: 65 IN | HEART RATE: 65 BPM | DIASTOLIC BLOOD PRESSURE: 75 MMHG | BODY MASS INDEX: 25.49 KG/M2 | WEIGHT: 153 LBS | SYSTOLIC BLOOD PRESSURE: 157 MMHG

## 2022-06-10 PROCEDURE — 99214 OFFICE O/P EST MOD 30 MIN: CPT

## 2022-06-10 NOTE — REASON FOR VISIT
[Follow-Up Visit] : a follow-up visit for [Back Pain] : back pain [FreeTextEntry2] : Lower back Pain

## 2022-06-10 NOTE — CONSULT LETTER
[Dear  ___] : Dear  [unfilled], [Please see my note below.] : Please see my note below. [Sincerely,] : Sincerely, [FreeTextEntry2] : Dr. Justus Gaston [FreeTextEntry3] : Dr. Timbo Grigsby

## 2022-06-10 NOTE — REVIEW OF SYSTEMS
[Joint Pain] : joint pain [Joint Stiffness] : joint stiffness [Negative] : Heme/Lymph [FreeTextEntry9] : Lower back Pain

## 2022-06-10 NOTE — HISTORY OF PRESENT ILLNESS
[Improving] : improving [de-identified] : Pt is a 67 year old female with Left L3-4 radiculopathy. HNP\par She presents to the office today with a follow up for lower back pain. \par pain radiates to lateral aspect of LLE to ankle.\par Somewhat better.\par On gabapentin.\par One ESIs Dr. Geoffrey Arellano.\par also numbness on left knee and shin.\par Left TKA 10 years ago. \par Pt took Motrin, this provided no pain relief.\par Pt took prednisone 4mg taper X2, this provided no pain relief\par Pt had Trigger point injection 1 week ago, this provided no pain relief\par Pt denies having JANEY in the past, does not participate with PT at this time\par Pt uses cane for ambulation due to pain.\par Pt denies fever, chills, weight changes, loss of bladder control, bowel incontinence or urinary retention or saddle anesthesia.\par Saw neurologist-Dr. Gaston.\par Here with .\par \par \par \par  [Ataxia] : no ataxia [Incontinence] : no incontinence [Loss of Dexterity] : good dexterity [Urinary Ret.] : no urinary retention

## 2022-06-10 NOTE — DISCUSSION/SUMMARY
[de-identified] : Left L3-4 radiculopathy.\par Discussed all options. \par GERD.\par Seeing Dr. Arellano for injection next week.\par Needs narcotics.\par F/U after 2nd injection.\par All options discussed and patient involved in decision making process including rest, medicine, home exercise, acupuncture, Chiropractic care, Physical Therapy, Pain management, and last resort surgery. All questions were answered, all alternatives discussed and the patient is in complete agreement with that plan. Follow-up appointment as instructed. Any issues and the patient will call or come in sooner. \par  agrees with the plan.\par

## 2022-06-10 NOTE — PHYSICAL EXAM
[Antalgic] : antalgic [Cane] : ambulates with cane [SLR] : positive straight leg raise [Oreilly's Sign] : negative Oreilly's sign [Pronator Drift] : negative pronator drift [de-identified] : 5 out of 5 motor strength, sensation is intact and symmetrical full range of motion flexion extension and rotation, no palpatory tenderness full range of motion of hips knees shoulders and elbows (all four extremities), no atrophy, negative straight leg raise, no pathological reflexes, no swelling, normal ambulation, no apparent distress skin is intact, no palpable lymph nodes, no upper or lower extremity instability, alert and oriented x3 and normal mood. Normal finger-to nose test. \par antalgic to right\par Decreased patellar reflex left.

## 2022-06-21 ENCOUNTER — RX RENEWAL (OUTPATIENT)
Age: 67
End: 2022-06-21

## 2022-06-23 ENCOUNTER — APPOINTMENT (OUTPATIENT)
Dept: ORTHOPEDIC SURGERY | Facility: CLINIC | Age: 67
End: 2022-06-23
Payer: COMMERCIAL

## 2022-06-23 VITALS — WEIGHT: 159 LBS | HEIGHT: 65 IN | BODY MASS INDEX: 26.49 KG/M2

## 2022-06-23 DIAGNOSIS — M54.16 RADICULOPATHY, LUMBAR REGION: ICD-10-CM

## 2022-06-23 PROCEDURE — 99214 OFFICE O/P EST MOD 30 MIN: CPT

## 2022-07-07 ENCOUNTER — APPOINTMENT (OUTPATIENT)
Dept: OTOLARYNGOLOGY | Facility: CLINIC | Age: 67
End: 2022-07-07

## 2022-07-07 VITALS — WEIGHT: 159 LBS | BODY MASS INDEX: 26.49 KG/M2 | TEMPERATURE: 98.1 F | HEIGHT: 65 IN

## 2022-07-07 PROCEDURE — 31575 DIAGNOSTIC LARYNGOSCOPY: CPT

## 2022-07-07 PROCEDURE — 99213 OFFICE O/P EST LOW 20 MIN: CPT | Mod: 25

## 2022-07-07 NOTE — ASSESSMENT
[FreeTextEntry1] : She has a postnasal drip and nasal congestion due to chronic rhinitis and reflux.   Flexible laryngoscopy showed a stable exam.  There were no suspicious masses or lesions.  The exam was reviewed with the patient and her \par \par Plan\par -Findings and management options were discussed with the patient.\par -Continue reflux precautions and medication for reflux.  GI follow-up is pending.\par -Nasal saline irrigation, nasal steroid spray, and antihistamines as needed.  \par -I again reviewed management options which include observation, repeat imaging study, and evaluation under general anesthesia.  Her exam is stable.  Repeat MRI did not show any suspicious lesions.  She is going to continue with observation.  She may consider repeat imaging study in the fall.  She would like to see the gastroenterologist first.  She is also having shoulder surgery in the fall.\par -Call or return earlier if any concerns or symptoms

## 2022-07-07 NOTE — HISTORY OF PRESENT ILLNESS
[de-identified] : KAYLA LOPEZ is a 67 year old patient here for follow-up.  She has a history of chronic rhinitis, chronic sinusitis, reflux, and vallecular cyst.  She continues to do well from an ENT standpoint.  She still has an intermittent mild postnasal drip and nasal congestion.  We have been monitoring the hypopharynx and larynx.  She has a stable small right vallecular cyst.  There is also concern for proteinaceous or fatty material in the right lateral pharyngeal recess.  On exam at her last visit, the area was carefully visualized and there was no lesions seen.  She continues to have no throat pain, dysphagia, voice change, or bleeding.  She has been having problems with spinal stenosis.  She is also scheduled to have shoulder surgery\par \par ENT history\par CT scan of the neck August 2020\par MRI with contrast of the neck January 2021-right-sided 8 mm vallecular retention cyst\par MRI with contrast of the neck March 2022–stable finding consistent with a right-sided 8 mm vallecular retention cyst. Probable trapped proteinaceous or fatty material within the right lateral pharyngeal recess\par She had sinus surgery in the past \par She has reflux

## 2022-07-25 ENCOUNTER — APPOINTMENT (OUTPATIENT)
Dept: ORTHOPEDIC SURGERY | Facility: CLINIC | Age: 67
End: 2022-07-25

## 2022-07-25 VITALS — HEIGHT: 65 IN | WEIGHT: 159 LBS | BODY MASS INDEX: 26.49 KG/M2

## 2022-07-25 PROCEDURE — 99214 OFFICE O/P EST MOD 30 MIN: CPT

## 2022-07-25 NOTE — DISCUSSION/SUMMARY
[de-identified] : Left L3-4 radiculopathy.\par Significantly improved.\par No leg pain.\par She does complain of low back pain.\par Discussed all options. \par GERD.\par Tizanidine.\par Lumbar HEP. \par Referral for physical therapy. \par All options discussed and patient involved in decision making process including rest, medicine, home exercise, acupuncture, Chiropractic care, Physical Therapy, Pain management, and last resort surgery. All questions were answered, all alternatives discussed and the patient is in complete agreement with that plan. Follow-up appointment as instructed. Any issues and the patient will call or come in sooner. \par \par

## 2022-07-25 NOTE — ADDENDUM
[FreeTextEntry1] : This note was written by Willis Paul on 07/25/2022 acting as scribe for Dr. Timbo Grigsby M.D.\par \par I, Timbo Grigsby MD, have read and attest that all the information, medical decision making and discharge instructions within are true and accurate.

## 2022-07-25 NOTE — PHYSICAL EXAM
[Cane] : ambulates with cane [Antalgic] : not antalgic [Oreilly's Sign] : negative Oreilly's sign [Pronator Drift] : negative pronator drift [SLR] : negative straight leg raise [de-identified] : 5 out of 5 motor strength, sensation is intact and symmetrical full range of motion flexion extension and rotation, no palpatory tenderness full range of motion of hips knees shoulders and elbows (all four extremities), no atrophy, negative straight leg raise, no pathological reflexes, no swelling, normal ambulation, no apparent distress skin is intact, no palpable lymph nodes, no upper or lower extremity instability, alert and oriented x3 and normal mood. Normal finger-to nose test. \par Antalgic to right\par Decreased patellar reflex left.

## 2022-07-25 NOTE — HISTORY OF PRESENT ILLNESS
[Improving] : improving [0] : a current pain level of 0/10 [None] : No exacerbating factors are noted [de-identified] : Pt is a 67 year old female with Left L3-4 radiculopathy. HNP\par She presents to the office today with a follow up for lower back pain. There has been some improvement, however, still with lower back complaints not relieved with Advil.\par LLE radiculopathy resolved\par Pt states pain and numbness resolved  since last visit on 06/10/22 but states she still has LBP\par Stopped gabapentin, tizanidine 4mg TID. \par Porsha Arellano.\par Left TKA 10 years ago. \par Pt took Motrin, this provided no pain relief.\par Pt took prednisone 4mg taper X2, this provided no pain relief\par Pt had Trigger point injection back in June, this provided no pain relief\par Pt denies having JANEY in the past, does not participate with PT at this time\par No longer using a cane for ambulation. \par Pt denies fever, chills, weight changes, loss of bladder control, bowel incontinence or urinary retention or saddle anesthesia.\par Saw neurologist-Dr. Gaston.\par Leg 80% better. \par No fever chills sweats nausea vomiting no bowel or bladder dysfunction, no recent weight loss or gain no night pain. This history is in addition to the intake form that I personally reviewed. \par \par \par  [Ataxia] : no ataxia [Incontinence] : no incontinence [Loss of Dexterity] : good dexterity [Urinary Ret.] : no urinary retention

## 2022-08-01 ENCOUNTER — APPOINTMENT (OUTPATIENT)
Dept: ULTRASOUND IMAGING | Facility: CLINIC | Age: 67
End: 2022-08-01

## 2022-08-01 ENCOUNTER — APPOINTMENT (OUTPATIENT)
Dept: RADIOLOGY | Facility: CLINIC | Age: 67
End: 2022-08-01

## 2022-08-01 ENCOUNTER — APPOINTMENT (OUTPATIENT)
Dept: MAMMOGRAPHY | Facility: CLINIC | Age: 67
End: 2022-08-01

## 2022-08-01 PROCEDURE — 77063 BREAST TOMOSYNTHESIS BI: CPT

## 2022-08-01 PROCEDURE — 76641 ULTRASOUND BREAST COMPLETE: CPT | Mod: RT

## 2022-08-01 PROCEDURE — 77080 DXA BONE DENSITY AXIAL: CPT

## 2022-08-01 PROCEDURE — 77067 SCR MAMMO BI INCL CAD: CPT

## 2022-10-07 ENCOUNTER — APPOINTMENT (OUTPATIENT)
Dept: OTOLARYNGOLOGY | Facility: CLINIC | Age: 67
End: 2022-10-07

## 2022-12-08 ENCOUNTER — NON-APPOINTMENT (OUTPATIENT)
Age: 67
End: 2022-12-08

## 2022-12-12 ENCOUNTER — APPOINTMENT (OUTPATIENT)
Dept: ORTHOPEDIC SURGERY | Facility: CLINIC | Age: 67
End: 2022-12-12

## 2022-12-12 VITALS
DIASTOLIC BLOOD PRESSURE: 90 MMHG | WEIGHT: 159 LBS | OXYGEN SATURATION: 98 % | SYSTOLIC BLOOD PRESSURE: 156 MMHG | HEART RATE: 62 BPM | RESPIRATION RATE: 18 BRPM | HEIGHT: 65 IN | BODY MASS INDEX: 26.49 KG/M2

## 2022-12-12 DIAGNOSIS — M51.36 OTHER INTERVERTEBRAL DISC DEGENERATION, LUMBAR REGION: ICD-10-CM

## 2022-12-12 DIAGNOSIS — M51.26 OTHER INTERVERTEBRAL DISC DISPLACEMENT, LUMBAR REGION: ICD-10-CM

## 2022-12-12 PROCEDURE — 99214 OFFICE O/P EST MOD 30 MIN: CPT

## 2022-12-12 NOTE — HISTORY OF PRESENT ILLNESS
[Improving] : improving [0] : a current pain level of 0/10 [None] : No exacerbating factors are noted [de-identified] : Pt is a 67 year old female presents today for a follow up evaluation of lower back pain. \par Last seen in July for left L3-4 radiculopathy 2/2 HNP that had improved at that time after an epidural at the end of May. \par She was recommended to continue with PT at that time. \par Pain had returned on 12/4.\par She underwent a right shoulder replacement at [facility] about 7 weeks ago. She states that she was in the same position while recovering and started to feel lower back pain while doing PT. She states that she turned while in the shower and felt the pain. \par She currently has lower back pain that radiates to the left anterior thigh and to the posterior calf. \par Sitting aggravates the pain. Pain is worse at night.\par She has been taking norco prescribed post her shoulder surgery. She also took tylenol and advil but no relief. \par She had went to PT for her shoulder, and they applied heat to her lower back which helped. \par Pain is not as bad as it was prior, but is still very painful. \par No fever chills sweats nausea vomiting no bowel or bladder dysfunction, no recent weight loss or gain no night pain. This history is in addition to the intake form that I personally reviewed. \par He is here with her .  The [Ataxia] : no ataxia [Incontinence] : no incontinence [Loss of Dexterity] : good dexterity [Urinary Ret.] : no urinary retention

## 2022-12-12 NOTE — ADDENDUM
[FreeTextEntry1] : This note was written by Willis Paul on 12/12/2022 acting as scribe for Dr. Timbo Grigsby M.D.\par \par I, Timbo Grigsby MD, have read and attest that all the information, medical decision making and discharge instructions within are true and accurate.

## 2022-12-12 NOTE — DISCUSSION/SUMMARY
[de-identified] : L3-4 disc herniation.\par Exacerbation of the low back pain from awkwardly twisting in the shower.\par Improving.\par Discussed all options. \par Diclofenac.\par If no better, MDP. \par All options discussed and patient involved in decision making process including rest, medicine, home exercise, acupuncture, Chiropractic care, Physical Therapy, Pain management, and last resort surgery. All questions were answered, all alternatives discussed and the patient is in complete agreement with that plan. Follow-up appointment as instructed. Any issues and the patient will call or come in sooner. \par Her  agrees with the plan.

## 2022-12-12 NOTE — REASON FOR VISIT
[Follow-Up Visit] : a follow-up visit for [Back Pain] : back pain [FreeTextEntry2] : Left L3-4 radiculopathy

## 2022-12-12 NOTE — PHYSICAL EXAM
[Normal] : Gait: normal [Antalgic] : not antalgic [Oreilly's Sign] : negative Oreilly's sign [Pronator Drift] : negative pronator drift [SLR] : negative straight leg raise [de-identified] : 5 out of 5 motor strength, sensation is intact and symmetrical full range of motion flexion extension and rotation, no palpatory tenderness full range of motion of hips knees shoulders and elbows (all four extremities), no atrophy, negative straight leg raise, no pathological reflexes, no swelling, normal ambulation, no apparent distress skin is intact, no palpable lymph nodes, no upper or lower extremity instability, alert and oriented x3 and normal mood. Normal finger-to nose test. \par Adequate patellar reflex bilaterally and quadriceps strength.

## 2023-01-05 ENCOUNTER — APPOINTMENT (OUTPATIENT)
Dept: ORTHOPEDIC SURGERY | Facility: CLINIC | Age: 68
End: 2023-01-05
Payer: MEDICARE

## 2023-01-05 VITALS
BODY MASS INDEX: 26.49 KG/M2 | TEMPERATURE: 98 F | HEIGHT: 65 IN | HEART RATE: 63 BPM | OXYGEN SATURATION: 98 % | WEIGHT: 159 LBS | DIASTOLIC BLOOD PRESSURE: 78 MMHG | SYSTOLIC BLOOD PRESSURE: 127 MMHG

## 2023-01-05 DIAGNOSIS — M54.50 LOW BACK PAIN, UNSPECIFIED: ICD-10-CM

## 2023-01-05 PROCEDURE — 99214 OFFICE O/P EST MOD 30 MIN: CPT

## 2023-01-05 PROCEDURE — 72100 X-RAY EXAM L-S SPINE 2/3 VWS: CPT

## 2023-01-23 ENCOUNTER — APPOINTMENT (OUTPATIENT)
Dept: ORTHOPEDIC SURGERY | Facility: CLINIC | Age: 68
End: 2023-01-23
Payer: MEDICARE

## 2023-01-23 VITALS — BODY MASS INDEX: 26.49 KG/M2 | WEIGHT: 159 LBS | HEIGHT: 65 IN

## 2023-01-23 PROCEDURE — 99214 OFFICE O/P EST MOD 30 MIN: CPT

## 2023-02-13 ENCOUNTER — APPOINTMENT (OUTPATIENT)
Dept: ORTHOPEDIC SURGERY | Facility: CLINIC | Age: 68
End: 2023-02-13
Payer: MEDICARE

## 2023-02-13 VITALS
BODY MASS INDEX: 27.66 KG/M2 | TEMPERATURE: 97.8 F | HEART RATE: 63 BPM | WEIGHT: 166 LBS | HEIGHT: 65 IN | DIASTOLIC BLOOD PRESSURE: 96 MMHG | SYSTOLIC BLOOD PRESSURE: 170 MMHG

## 2023-02-13 DIAGNOSIS — M48.07 SPINAL STENOSIS, LUMBOSACRAL REGION: ICD-10-CM

## 2023-02-13 PROCEDURE — 72100 X-RAY EXAM L-S SPINE 2/3 VWS: CPT

## 2023-02-13 PROCEDURE — 99214 OFFICE O/P EST MOD 30 MIN: CPT

## 2023-02-14 ENCOUNTER — APPOINTMENT (OUTPATIENT)
Dept: PULMONOLOGY | Facility: CLINIC | Age: 68
End: 2023-02-14
Payer: MEDICARE

## 2023-02-14 VITALS
DIASTOLIC BLOOD PRESSURE: 102 MMHG | TEMPERATURE: 98 F | RESPIRATION RATE: 16 BRPM | OXYGEN SATURATION: 97 % | HEART RATE: 75 BPM | SYSTOLIC BLOOD PRESSURE: 174 MMHG

## 2023-02-14 VITALS — DIASTOLIC BLOOD PRESSURE: 90 MMHG | SYSTOLIC BLOOD PRESSURE: 156 MMHG

## 2023-02-14 PROCEDURE — 99213 OFFICE O/P EST LOW 20 MIN: CPT | Mod: 25

## 2023-02-14 PROCEDURE — 94010 BREATHING CAPACITY TEST: CPT

## 2023-02-14 RX ORDER — MONTELUKAST 10 MG/1
10 TABLET, FILM COATED ORAL DAILY
Qty: 30 | Refills: 0 | Status: DISCONTINUED | COMMUNITY
Start: 2020-06-30 | End: 2023-02-14

## 2023-02-14 NOTE — HISTORY OF PRESENT ILLNESS
[Former] : former [TextBox_4] : has been doing well on breo \par no complaints\par here for yearly check up

## 2023-02-14 NOTE — PROCEDURE
[FreeTextEntry1] : nereida: normal\par \par \par Reviewed:\par \par \par EXAM: CT CHEST\par \par \par PROCEDURE DATE: 02/09/2021\par \par \par \par INTERPRETATION: Clinical information: Pulmonary nodule. Exam is compared to previous study of 8/12/2011.\par \par CT scan of the chest was obtained without administration of intravenous contrast.\par \par No hilar and/or mediastinal adenopathy is noted.\par \par Heart is normal in size. Calcification the coronary arteries noted. No pericardial effusion is noted.\par \par No endobronchial lesions are noted. Calcified nodules are noted in the left upper and left lower lobes. They are unchanged when compared to previous exam. Remaining lungs are clear. No pleural effusions are noted.\par \par Below the diaphragm, visualized portions of the abdomen are unremarkable.\par \par Degenerative changes of the spine are noted.\par \par Impression: No noncalcified pulmonary nodules are noted.\par \par

## 2023-03-07 ENCOUNTER — APPOINTMENT (OUTPATIENT)
Dept: ANESTHESIOLOGY | Facility: CLINIC | Age: 68
End: 2023-03-07

## 2023-03-07 ENCOUNTER — OUTPATIENT (OUTPATIENT)
Dept: OUTPATIENT SERVICES | Facility: HOSPITAL | Age: 68
LOS: 1 days | End: 2023-03-07
Payer: MEDICARE

## 2023-03-07 DIAGNOSIS — M54.16 RADICULOPATHY, LUMBAR REGION: ICD-10-CM

## 2023-03-07 PROCEDURE — 64484 NJX AA&/STRD TFRM EPI L/S EA: CPT

## 2023-03-07 PROCEDURE — 64483 NJX AA&/STRD TFRM EPI L/S 1: CPT

## 2023-03-13 ENCOUNTER — APPOINTMENT (OUTPATIENT)
Dept: ORTHOPEDIC SURGERY | Facility: CLINIC | Age: 68
End: 2023-03-13
Payer: MEDICARE

## 2023-03-13 VITALS
DIASTOLIC BLOOD PRESSURE: 74 MMHG | HEART RATE: 73 BPM | OXYGEN SATURATION: 97 % | HEIGHT: 65 IN | BODY MASS INDEX: 27.66 KG/M2 | WEIGHT: 166 LBS | SYSTOLIC BLOOD PRESSURE: 114 MMHG

## 2023-03-13 DIAGNOSIS — S32.009A UNSPECIFIED FRACTURE OF UNSPECIFIED LUMBAR VERTEBRA, INITIAL ENCOUNTER FOR CLOSED FRACTURE: ICD-10-CM

## 2023-03-13 PROCEDURE — 99214 OFFICE O/P EST MOD 30 MIN: CPT

## 2023-03-24 ENCOUNTER — APPOINTMENT (OUTPATIENT)
Dept: OTOLARYNGOLOGY | Facility: CLINIC | Age: 68
End: 2023-03-24
Payer: MEDICARE

## 2023-03-24 VITALS — WEIGHT: 166 LBS | BODY MASS INDEX: 27.66 KG/M2 | HEIGHT: 65 IN

## 2023-03-24 DIAGNOSIS — J38.7 OTHER DISEASES OF LARYNX: ICD-10-CM

## 2023-03-24 DIAGNOSIS — J31.0 CHRONIC RHINITIS: ICD-10-CM

## 2023-03-24 DIAGNOSIS — K21.9 GASTRO-ESOPHAGEAL REFLUX DISEASE W/OUT ESOPHAGITIS: ICD-10-CM

## 2023-03-24 PROCEDURE — 99213 OFFICE O/P EST LOW 20 MIN: CPT | Mod: 25

## 2023-03-24 PROCEDURE — 31575 DIAGNOSTIC LARYNGOSCOPY: CPT

## 2023-03-24 RX ORDER — PANTOPRAZOLE SODIUM 20 MG/1
20 TABLET, DELAYED RELEASE ORAL
Refills: 0 | Status: ACTIVE | COMMUNITY

## 2023-03-24 RX ORDER — CITALOPRAM 10 MG/1
TABLET, FILM COATED ORAL
Refills: 0 | Status: ACTIVE | COMMUNITY

## 2023-03-24 RX ORDER — IRBESARTAN 300 MG/1
TABLET ORAL
Refills: 0 | Status: ACTIVE | COMMUNITY

## 2023-03-24 RX ORDER — FLUTICASONE FUROATE AND VILANTEROL TRIFENATATE 100; 25 UG/1; UG/1
100-25 POWDER RESPIRATORY (INHALATION)
Qty: 3 | Refills: 1 | Status: COMPLETED | COMMUNITY
Start: 2020-08-06 | End: 2023-03-24

## 2023-03-24 RX ORDER — DICLOFENAC SODIUM 75 MG/1
75 TABLET, DELAYED RELEASE ORAL
Qty: 60 | Refills: 1 | Status: COMPLETED | COMMUNITY
Start: 2022-12-12 | End: 2023-03-24

## 2023-03-24 RX ORDER — ASPIRIN 81 MG
81 TABLET, DELAYED RELEASE (ENTERIC COATED) ORAL
Refills: 0 | Status: COMPLETED | COMMUNITY
End: 2023-03-24

## 2023-03-24 RX ORDER — METHYLPREDNISOLONE 4 MG/1
4 TABLET ORAL
Qty: 2 | Refills: 1 | Status: COMPLETED | COMMUNITY
Start: 2023-01-05 | End: 2023-03-24

## 2023-03-24 RX ORDER — NAPROXEN 500 MG/1
500 TABLET ORAL
Qty: 60 | Refills: 0 | Status: COMPLETED | COMMUNITY
Start: 2022-05-26 | End: 2023-03-24

## 2023-03-24 RX ORDER — METHYLPREDNISOLONE 4 MG/1
4 TABLET ORAL
Qty: 1 | Refills: 1 | Status: COMPLETED | COMMUNITY
Start: 2022-12-12 | End: 2023-03-24

## 2023-03-24 RX ORDER — ATENOLOL 50 MG/1
TABLET ORAL
Refills: 0 | Status: ACTIVE | COMMUNITY

## 2023-03-24 RX ORDER — AZELASTINE HYDROCHLORIDE 137 UG/1
0.1 SPRAY, METERED NASAL TWICE DAILY
Qty: 1 | Refills: 3 | Status: COMPLETED | COMMUNITY
Start: 2021-03-26 | End: 2023-03-24

## 2023-03-24 RX ORDER — ALBUTEROL SULFATE 90 UG/1
108 (90 BASE) INHALANT RESPIRATORY (INHALATION)
Qty: 1 | Refills: 1 | Status: COMPLETED | COMMUNITY
Start: 2020-09-10 | End: 2023-03-24

## 2023-03-24 RX ORDER — SOLIFENACIN SUCCINATE 10 MG/1
10 TABLET, FILM COATED ORAL
Refills: 0 | Status: ACTIVE | COMMUNITY

## 2023-03-24 NOTE — HISTORY OF PRESENT ILLNESS
[de-identified] : AKYLA LOPEZ is a 67 year old patient with a history of chronic rhinitis, chronic sinusitis, reflux, and vallecular cyst.  She has mucus in the throat and mild nasal congestion but is otherwise doing well.  We have been monitoring her exam with regards to the vallecular cyst.  She has no throat pain, dysphagia, or voice change.  She continues to have problems with spinal stenosis of her lower back.  She had shoulder surgery and that improved a lot\par \par ENT history\par CT scan of the neck August 2020\par MRI with contrast of the neck January 2021-right-sided 8 mm vallecular retention cyst\par MRI with contrast of the neck March 2022–stable finding consistent with a right-sided 8 mm vallecular retention cyst. Probable trapped proteinaceous or fatty material within the right lateral pharyngeal recess\par She had sinus surgery in the past \par She has reflux

## 2023-03-24 NOTE — REASON FOR VISIT
[Subsequent Evaluation] : a subsequent evaluation for [FreeTextEntry2] : Vallecular cyst and chronic rhinitis

## 2023-03-24 NOTE — ASSESSMENT
[FreeTextEntry1] : She has chronic rhinitis, reflux and postnasal drip.  On flexible endoscopy, her exam is unchanged.  There were no suspicious masses or lesions.\par \par Plan\par -Findings and management options were discussed with the patient.\par -Continue reflux precautions and medication for reflux.  She was given updated literature.\par -Nasal saline irrigations and antihistamine or decongestant as needed.  She is going to try nasal steroid spray again.  We may consider a trial of Atrovent depending on how she does\par -We will continue to monitor her her laryngeal and hypopharyngeal exam.  There were no obvious suspicious masses on exam.  We again discussed whether or not attempted biopsy is needed.  The findings appear benign on imaging.  She could consider repeat imaging study if she wishes.\par -Follow-up in 6 months.\par -Call or return earlier if any concerns or symptoms

## 2023-04-19 ENCOUNTER — EMERGENCY (EMERGENCY)
Facility: HOSPITAL | Age: 68
LOS: 1 days | Discharge: HOME | End: 2023-04-19
Attending: EMERGENCY MEDICINE
Payer: MEDICARE

## 2023-04-19 VITALS
TEMPERATURE: 98 F | RESPIRATION RATE: 18 BRPM | DIASTOLIC BLOOD PRESSURE: 88 MMHG | HEART RATE: 71 BPM | SYSTOLIC BLOOD PRESSURE: 160 MMHG | OXYGEN SATURATION: 97 %

## 2023-04-19 VITALS
OXYGEN SATURATION: 98 % | DIASTOLIC BLOOD PRESSURE: 99 MMHG | HEART RATE: 93 BPM | RESPIRATION RATE: 20 BRPM | SYSTOLIC BLOOD PRESSURE: 174 MMHG | TEMPERATURE: 98 F

## 2023-04-19 LAB
ALBUMIN SERPL ELPH-MCNC: 4.6 G/DL — SIGNIFICANT CHANGE UP (ref 3.3–5)
ALP SERPL-CCNC: 56 U/L — SIGNIFICANT CHANGE UP (ref 40–120)
ALT FLD-CCNC: 15 U/L — SIGNIFICANT CHANGE UP (ref 10–45)
ANION GAP SERPL CALC-SCNC: 12 MMOL/L — SIGNIFICANT CHANGE UP (ref 5–17)
APTT BLD: 26.1 SEC — LOW (ref 27.5–35.5)
AST SERPL-CCNC: 15 U/L — SIGNIFICANT CHANGE UP (ref 10–40)
BASE EXCESS BLDV CALC-SCNC: 2.2 MMOL/L — SIGNIFICANT CHANGE UP (ref -2–3)
BASOPHILS # BLD AUTO: 0.03 K/UL — SIGNIFICANT CHANGE UP (ref 0–0.2)
BASOPHILS NFR BLD AUTO: 0.4 % — SIGNIFICANT CHANGE UP (ref 0–2)
BILIRUB SERPL-MCNC: 0.2 MG/DL — SIGNIFICANT CHANGE UP (ref 0.2–1.2)
BUN SERPL-MCNC: 22 MG/DL — SIGNIFICANT CHANGE UP (ref 7–23)
CA-I SERPL-SCNC: 1.34 MMOL/L — HIGH (ref 1.15–1.33)
CALCIUM SERPL-MCNC: 10.4 MG/DL — SIGNIFICANT CHANGE UP (ref 8.4–10.5)
CHLORIDE BLDV-SCNC: 103 MMOL/L — SIGNIFICANT CHANGE UP (ref 96–108)
CHLORIDE SERPL-SCNC: 103 MMOL/L — SIGNIFICANT CHANGE UP (ref 96–108)
CO2 BLDV-SCNC: 29 MMOL/L — HIGH (ref 22–26)
CO2 SERPL-SCNC: 24 MMOL/L — SIGNIFICANT CHANGE UP (ref 22–31)
CREAT SERPL-MCNC: 0.6 MG/DL — SIGNIFICANT CHANGE UP (ref 0.5–1.3)
EGFR: 99 ML/MIN/1.73M2 — SIGNIFICANT CHANGE UP
EOSINOPHIL # BLD AUTO: 0.11 K/UL — SIGNIFICANT CHANGE UP (ref 0–0.5)
EOSINOPHIL NFR BLD AUTO: 1.5 % — SIGNIFICANT CHANGE UP (ref 0–6)
ETHANOL SERPL-MCNC: <10 MG/DL — SIGNIFICANT CHANGE UP (ref 0–10)
GAS PNL BLDV: 136 MMOL/L — SIGNIFICANT CHANGE UP (ref 136–145)
GAS PNL BLDV: SIGNIFICANT CHANGE UP
GLUCOSE BLDV-MCNC: 118 MG/DL — HIGH (ref 70–99)
GLUCOSE SERPL-MCNC: 121 MG/DL — HIGH (ref 70–99)
HCO3 BLDV-SCNC: 27 MMOL/L — SIGNIFICANT CHANGE UP (ref 22–29)
HCT VFR BLD CALC: 41.1 % — SIGNIFICANT CHANGE UP (ref 34.5–45)
HCT VFR BLDA CALC: 45 % — SIGNIFICANT CHANGE UP (ref 34.5–46.5)
HGB BLD CALC-MCNC: 15 G/DL — SIGNIFICANT CHANGE UP (ref 11.7–16.1)
HGB BLD-MCNC: 14.3 G/DL — SIGNIFICANT CHANGE UP (ref 11.5–15.5)
IMM GRANULOCYTES NFR BLD AUTO: 0.4 % — SIGNIFICANT CHANGE UP (ref 0–0.9)
INR BLD: 0.91 RATIO — SIGNIFICANT CHANGE UP (ref 0.88–1.16)
LACTATE BLDV-MCNC: 3 MMOL/L — HIGH (ref 0.5–2)
LACTATE SERPL-SCNC: 1.4 MMOL/L — SIGNIFICANT CHANGE UP (ref 0.5–2)
LYMPHOCYTES # BLD AUTO: 2.41 K/UL — SIGNIFICANT CHANGE UP (ref 1–3.3)
LYMPHOCYTES # BLD AUTO: 32.7 % — SIGNIFICANT CHANGE UP (ref 13–44)
MCHC RBC-ENTMCNC: 32.1 PG — SIGNIFICANT CHANGE UP (ref 27–34)
MCHC RBC-ENTMCNC: 34.8 GM/DL — SIGNIFICANT CHANGE UP (ref 32–36)
MCV RBC AUTO: 92.4 FL — SIGNIFICANT CHANGE UP (ref 80–100)
MONOCYTES # BLD AUTO: 0.62 K/UL — SIGNIFICANT CHANGE UP (ref 0–0.9)
MONOCYTES NFR BLD AUTO: 8.4 % — SIGNIFICANT CHANGE UP (ref 2–14)
NEUTROPHILS # BLD AUTO: 4.18 K/UL — SIGNIFICANT CHANGE UP (ref 1.8–7.4)
NEUTROPHILS NFR BLD AUTO: 56.6 % — SIGNIFICANT CHANGE UP (ref 43–77)
NRBC # BLD: 0 /100 WBCS — SIGNIFICANT CHANGE UP (ref 0–0)
PCO2 BLDV: 43 MMHG — HIGH (ref 39–42)
PH BLDV: 7.41 — SIGNIFICANT CHANGE UP (ref 7.32–7.43)
PLATELET # BLD AUTO: 268 K/UL — SIGNIFICANT CHANGE UP (ref 150–400)
PO2 BLDV: 31 MMHG — SIGNIFICANT CHANGE UP (ref 25–45)
POTASSIUM BLDV-SCNC: 3.7 MMOL/L — SIGNIFICANT CHANGE UP (ref 3.5–5.1)
POTASSIUM SERPL-MCNC: 3.7 MMOL/L — SIGNIFICANT CHANGE UP (ref 3.5–5.3)
POTASSIUM SERPL-SCNC: 3.7 MMOL/L — SIGNIFICANT CHANGE UP (ref 3.5–5.3)
PROT SERPL-MCNC: 7.4 G/DL — SIGNIFICANT CHANGE UP (ref 6–8.3)
PROTHROM AB SERPL-ACNC: 10.6 SEC — SIGNIFICANT CHANGE UP (ref 10.5–13.4)
RBC # BLD: 4.45 M/UL — SIGNIFICANT CHANGE UP (ref 3.8–5.2)
RBC # FLD: 13 % — SIGNIFICANT CHANGE UP (ref 10.3–14.5)
SAO2 % BLDV: 50.2 % — LOW (ref 67–88)
SODIUM SERPL-SCNC: 139 MMOL/L — SIGNIFICANT CHANGE UP (ref 135–145)
TROPONIN T, HIGH SENSITIVITY RESULT: <6 NG/L — SIGNIFICANT CHANGE UP (ref 0–51)
WBC # BLD: 7.38 K/UL — SIGNIFICANT CHANGE UP (ref 3.8–10.5)
WBC # FLD AUTO: 7.38 K/UL — SIGNIFICANT CHANGE UP (ref 3.8–10.5)

## 2023-04-19 PROCEDURE — 70498 CT ANGIOGRAPHY NECK: CPT | Mod: MA

## 2023-04-19 PROCEDURE — 82803 BLOOD GASES ANY COMBINATION: CPT

## 2023-04-19 PROCEDURE — 85610 PROTHROMBIN TIME: CPT

## 2023-04-19 PROCEDURE — 71045 X-RAY EXAM CHEST 1 VIEW: CPT | Mod: 26

## 2023-04-19 PROCEDURE — 0042T: CPT | Mod: MA

## 2023-04-19 PROCEDURE — 71045 X-RAY EXAM CHEST 1 VIEW: CPT

## 2023-04-19 PROCEDURE — 93005 ELECTROCARDIOGRAM TRACING: CPT

## 2023-04-19 PROCEDURE — 85025 COMPLETE CBC W/AUTO DIFF WBC: CPT

## 2023-04-19 PROCEDURE — 82962 GLUCOSE BLOOD TEST: CPT

## 2023-04-19 PROCEDURE — 80307 DRUG TEST PRSMV CHEM ANLYZR: CPT

## 2023-04-19 PROCEDURE — 85730 THROMBOPLASTIN TIME PARTIAL: CPT

## 2023-04-19 PROCEDURE — 84132 ASSAY OF SERUM POTASSIUM: CPT

## 2023-04-19 PROCEDURE — 82435 ASSAY OF BLOOD CHLORIDE: CPT

## 2023-04-19 PROCEDURE — 85018 HEMOGLOBIN: CPT

## 2023-04-19 PROCEDURE — 70450 CT HEAD/BRAIN W/O DYE: CPT | Mod: 26,MA,59

## 2023-04-19 PROCEDURE — 70496 CT ANGIOGRAPHY HEAD: CPT | Mod: MA

## 2023-04-19 PROCEDURE — 82947 ASSAY GLUCOSE BLOOD QUANT: CPT

## 2023-04-19 PROCEDURE — 84484 ASSAY OF TROPONIN QUANT: CPT

## 2023-04-19 PROCEDURE — 82330 ASSAY OF CALCIUM: CPT

## 2023-04-19 PROCEDURE — 70450 CT HEAD/BRAIN W/O DYE: CPT | Mod: MA

## 2023-04-19 PROCEDURE — 70496 CT ANGIOGRAPHY HEAD: CPT | Mod: 26,MA

## 2023-04-19 PROCEDURE — 85014 HEMATOCRIT: CPT

## 2023-04-19 PROCEDURE — 83605 ASSAY OF LACTIC ACID: CPT

## 2023-04-19 PROCEDURE — 70498 CT ANGIOGRAPHY NECK: CPT | Mod: 26,MA

## 2023-04-19 PROCEDURE — 80053 COMPREHEN METABOLIC PANEL: CPT

## 2023-04-19 PROCEDURE — 84295 ASSAY OF SERUM SODIUM: CPT

## 2023-04-19 PROCEDURE — 99291 CRITICAL CARE FIRST HOUR: CPT | Mod: 25

## 2023-04-19 PROCEDURE — 99284 EMERGENCY DEPT VISIT MOD MDM: CPT | Mod: FS

## 2023-04-19 RX ORDER — SODIUM CHLORIDE 9 MG/ML
1000 INJECTION, SOLUTION INTRAVENOUS ONCE
Refills: 0 | Status: COMPLETED | OUTPATIENT
Start: 2023-04-19 | End: 2023-04-19

## 2023-04-19 RX ADMIN — SODIUM CHLORIDE 1000 MILLILITER(S): 9 INJECTION, SOLUTION INTRAVENOUS at 17:50

## 2023-04-19 NOTE — ED PROVIDER NOTE - PATIENT PORTAL LINK FT
You can access the FollowMyHealth Patient Portal offered by [facility] by registering at the following website: [email or web address]. By joining [facility]’s FollowMyHealth portal, you will also be able to view your health information using other applications (apps) compatible with our system.

## 2023-04-19 NOTE — ED PROVIDER NOTE - CARE PROVIDERS DIRECT ADDRESSES
Pt signed out AMA, pt stated she will monitor her Blood pressure at home and return on tomorrow. Dr. Bello notified of patient status.    ,DirectAddress_Unknown

## 2023-04-19 NOTE — ED ADULT NURSE NOTE - OBJECTIVE STATEMENT
67y F AXO3 PMH of hypertension and PSH of shoulder replacement presented to the ED c/o inability to speak at 1430 today. Pt states that she woke up from nap at 1400 and around 1430 pt reports being unable to "read cellphone, couldn't speak, and was mixing words". Pt states that she was able to write the word "stroke" on a piece of paper and gave it to her . Pt's  is at bedside. Pt reports that this episode lasted for approximately "15 minutes".  Pt endorses "not feeling like herself", having "pins and needles" sensation of L hand, and seeing "silver stars" out of her R eye. Upon arrival to the ED, the pt is well appearing, has even, bilateral, and unlabored chest rise, and ambulatory steady gait. Upon assessment, pt has even and bilateral peripheral pulses, even and bilateral sensation, ROM, and soft, non-tender, non-distended, and soft abdomen. Pt denies chest pain, SOB, fevers, chills, urinary symptoms, black or bloody stools, and blood thinners.

## 2023-04-19 NOTE — ED PROVIDER NOTE - NSICDXPASTMEDICALHX_GEN_ALL_CORE_FT
PAST MEDICAL HISTORY:  IBS (Irritable Bowel Syndrome)     Menopause     Migraine     Mitral Valve Prolapse     Rheumatoid Arthritis

## 2023-04-19 NOTE — ED PROVIDER NOTE - ATTENDING APP SHARED VISIT CONTRIBUTION OF CARE
This patient presents with evidence of a high probability of an imminent life or limb threatening condition requiring rapid intervention to prevent deterioration in the patient’s condition.  High complexity decision making to assess, manipulate, and support this patient is documented below.  It is my clinical judgement that failure to initiate these interventions on an urgent basis would likely result in sudden, clinically significant or life threatening deterioration in the patient's condition.      The total time spent evaluating, managing, and providing care to a critically ill patient was: 30 minutes.  This includes direct patient care at the bedside as well as time spent reviewing test results, discussing the case with consultants or family members, and documenting in the patient's chart.   It was exclusive of separately billable procedures and any teaching time related to this case.    Please see the rest of the note for further information on patient assessment and treatment.     Attending note (Venkatesh): 66y/o F presenting after episode of difficulty reading and speaking, resolved after 15 minutes though reporting that she doesn't feel "right;" on exam has no focal neurologic deficits; is speaking clearly/logically; hypertensive but not tachycardic, hypoxic or febrile; concern for possible TIA/small CVA; NIH SS approx 0; code stroke called by triage and patient's initial evaluation in CT scan suite with concurrent exam by neuro/stroke team, as per hospital policy; obtaining CT head, CTA-head/neck and CT-brain/perfusion; obtain screening labs; disposition pending review of labs/imaging; evaluate for metabolic derangements, evidence of infection (none on initial exam/afebrile) vs central neurologic process (ischemic/hemorrhagic stroke, TIA, mass/mass effect).

## 2023-04-19 NOTE — CONSULT NOTE ADULT - ASSESSMENT
67y F with Hx migraine headache, HTN, MVP, depression, family history of TIA, who presents w/ transient aphasia. Most concerning for TIA.      LKW: 2pm 4/19/23  NIHSS:  1  Baseline MRS: 0  Not a Teneceteplase candidate due to symptoms resolved and non-disabling  Not a thrombectomy candidate due to no LVO    CT head: R basal ganglia punctate calcification, no acute pathology (i.e. hemorrhage, infarct, midline shift)  CTA/P: patent vessels, no LVO    Impression:  transient aphasia, now resolved. CTH and CTA/P unremarkable. Most consistent w/ TIA. Would start on baby aspirin w/ close neurology follow-up.    Mechanism: pending further work-up    Recommendations:  [] start aspirin 81mg daily  [] follow-up in <1 week w/ Dr Ga Grimes ([phone]). Outpatient open MRI w/ w/o contrast, serum lipid panel, and A1c. If LDL>70, would start on Lipitor 40mg daily for stroke prevention  [] follow-up w/ cardiologist as planned. C/w atenolol and ibesartan as prescribed.  [] Fiorcet may not be optimal choice for headache treatment, due to barbituates (tendency for withdrawal if discontinued) and their tendency to cause side effects. However, would continue Fiorcet for now and discuss w/ Dr. Grimes as outpatient. In the meantime, patient encouraged to take Alleve, Motrin, Advil as needed for acute headache  [] fu EKG report  [] stroke education was provided (BEFAST criteria). Please encourage patient to return to ED if symptoms recur.    Discussed with stroke fellow Dr. Zehra Angeles  and under supervision of attending  Dr. Tabby Mcknight     regarding decision against candidacy for Tenecteplase / thrombectomy.

## 2023-04-19 NOTE — ED PROVIDER NOTE - OBJECTIVE STATEMENT
Attending note (Venkatesh): 68 y/o F c/o difficulty reading and speaking that began after waking from a nap this afternoon around 230pm; reports she was awake/normal at 2pm.  Symptoms lasted approximately 15-20 minutes and improved, though states she still doesn't feel "right." REports funny feeling in left arm but not having numbness or weakness; no pain in arm, no chest pain or shortness of breath.

## 2023-04-19 NOTE — ED PROVIDER NOTE - NSFOLLOWUPINSTRUCTIONS_ED_ALL_ED_FT
1- Start Aspirin 81 mg daily  2- Follow up with Dr Ga Lucas  3- Continue Fiorcet for now and discuss w/ Dr. Grimes as outpatient. In the meantime, patient encouraged to take Alleve, Motrin, Advil as needed for acute headache  4- Return to ER for any new or worsening complaints

## 2023-04-19 NOTE — ED PROVIDER NOTE - NSICDXPASTSURGICALHX_GEN_ALL_CORE_FT
PAST SURGICAL HISTORY:  History of Dilation and Curettage     Hypertension     Medial Meniscus Tear

## 2023-04-19 NOTE — ED PROVIDER NOTE - NS ED ATTENDING STATEMENT MOD
This was a shared visit with the TONIA. I reviewed and verified the documentation and independently performed the documented:

## 2023-04-19 NOTE — ED PROVIDER NOTE - PHYSICAL EXAMINATION
On Physical Exam:  General: well appearing, in NAD, speaking clearly in full sentences and without difficulty; cooperative with exam  HEENT: PERRL, MMM  Neck: no neck tenderness, no nuchal rigidity  Cardiac: normal s1, s2; RRR; no MGR  Lungs: CTABL  Abdomen: soft nontender/nondistended  : no bladder tenderness or distension  Skin: intact, no rash  Extremities: no peripheral edema, no gross deformities  Neuro Exam:  -Overall muscular tone: Normal bulk and tone throughout.  No gross rigidity/clonus/tremors.  -No pronator drift.  -Gait steady  -Motor:                      Deltoid    Biceps    Triceps      Wrist Ex   Wrist Flex          Left:          5/5 5/5 5/5 5/5 5/5               5/5    Right:       5/5 5/5 5/5 5/5 5/5               5/5                          Hip Flex    Hip Ext   Knee Flex   Knee Ex   Ankle Dorsiflex   Ankle Plantarflex      Left:          5/5 5/5 5/5 5/5           5/5                      5/5    Right:       5/5 5/5 5/5 5/5 5/5                      5/5        Sensation:                   Upper Arm     Lower Arm    Palm      Lat Hip   Inner Thigh    Shin    Plantar Foot    Left:          +                    +                 +           +                +             +         +    Right:        +                    +                 +           +                +             +         +

## 2023-04-19 NOTE — CONSULT NOTE ADULT - SUBJECTIVE AND OBJECTIVE BOX
Neurology - Consult Note    -  Spectra: [phone] ([facility]), [phone] ([facility])  -    HPI: Patient KAYLA LOPEZ is a 67y (1955) woman with a PMHx significant for migraine headaches (on Fiorcet), HTN (on ibesartan), depression (on citalopram), mitral valve prolapse (on atenolol), back/shoulder pain (recently started on Lyrica), who presents as code stroke. LKW at 2pm today. She woke-up at 2:30 pm, and had 15 minutes of aphasia. She could comprehend what her  was saying, but her speech was not making sense. She also could not read letters on her iPhone. As her  was driving her to the ED, her speech started to improve and by the end of the code stroke the NIHSS was 0. She also endorses chronic migraines (20+ years) w/ R eye visual auras (silver lightning bolts) and L-sided headache. She states her headaches have worsened in the past month following an epidural pain procedure. The pain is quickly aborted w/ Fiorcet. Pain is associated w/ photophobia, but no nausea/vomiting. No family history of headaches. Mother had TIA, breast cancer, and lung cancer. She follows w/ cardiologist for MVP and states that she was recently seen by him in clinic. Lab results at that time were remarkable for elevated cholesterol, but they elected to pursue lifestyle changes and re-assess need for medication during follow-up visit in a few months. She reports negative stress test and TTE about 1 year ago. She reports intermittent paresthesias in L arm and intermittent numbness in b/l hands. The numbness lasts a few minutes, usually occurs when she sleeps, and is better if she shakes her hands out.      Review of Systems:    CONSTITUTIONAL: No fevers or chills; +chronic headaches  EYES AND ENT: +visual auras  NECK: No pain or stiffness  RESPIRATORY: No hemoptysis or shortness of breath  CARDIOVASCULAR: No chest pain or palpitations  GASTROINTESTINAL: No melena or hematochezia  GENITOURINARY: No dysuria or hematuria  NEUROLOGICAL: +As stated in HPI above;   SKIN: No itching, burning, rashes, or lesions   All other review of systems is negative unless indicated above.    Allergies:  penicillin (Unknown)      PMHx/PSHx/Family Hx: As above, otherwise see below   Migraine    Mitral Valve Prolapse    Menopause    IBS (Irritable Bowel Syndrome)    Rheumatoid Arthritis        Social Hx:  No current use of tobacco, alcohol, or illicit drugs      Medications:  MEDICATIONS  (STANDING):    MEDICATIONS  (PRN):      Vitals:  T(C): 36.7 (04-19-23 @ 17:24), Max: 36.7 (04-19-23 @ 16:42)  HR: 86 (04-19-23 @ 17:24) (86 - 93)  BP: 153/98 (04-19-23 @ 17:24) (153/98 - 174/99)  RR: 18 (04-19-23 @ 17:24) (18 - 20)  SpO2: 97% (04-19-23 @ 17:24) (97% - 98%)    Physical Examination: INCOMPLETE  General - NAD, well-developed female, talkative and interactive  Cardiovascular - Peripheral pulses palpable, no edema  Eyes - Fundoscopy not performed due to safety precautions in the setting of the COVID-19 pandemic    Neurologic Exam:  Mental status - Awake, Alert, Oriented to person, place, and time. Speech fluent, repetition and naming intact. Follows simple and complex commands. Attention/concentration, recent and remote memory (including registration and recall), and fund of knowledge intact    Cranial nerves - PERRLA, VFF, EOMI, face sensation (V1-V3) intact b/l, facial strength intact without asymmetry b/l, hearing intact b/l, palate with symmetric elevation, trapezius 5/5 strength b/l, tongue midline on protrusion with full lateral movement    Motor - Normal bulk and tone throughout. No pronator drift.  Strength testing            Deltoid      Biceps      Triceps     Wrist Extension    Wrist Flexion     Interossei         R            5                 5               5                     5                              5                        5                 5  L             5                 5               5                     5                              5                        5                 5              Hip Flexion    Hip Extension    Knee Flexion    Knee Extension    Dorsiflexion    Plantar Flexion  R              5                           5                       5                           5                            5                          5  L              5                           5                        5                           5                            5                          5    Sensation - Light touch intact throughout. No extinction to DSS    DTR's - deferred due to acute symptoms and need for urgent imaging    Coordination - Finger to Nose intact b/l. No tremors appreciated    Gait and station - Normal casual gait. Ambulating unassisted.    Labs:                        14.3   7.38  )-----------( 268      ( 19 Apr 2023 17:13 )             41.1     04-19    139  |  103  |  22  ----------------------------<  121<H>  3.7   |  24  |  0.60    Ca    10.4      19 Apr 2023 17:13    TPro  7.4  /  Alb  4.6  /  TBili  0.2  /  DBili  x   /  AST  15  /  ALT  15  /  AlkPhos  56  04-19    CAPILLARY BLOOD GLUCOSE  115 (19 Apr 2023 17:00)      POCT Blood Glucose.: 115 mg/dL (19 Apr 2023 16:44)    LIVER FUNCTIONS - ( 19 Apr 2023 17:13 )  Alb: 4.6 g/dL / Pro: 7.4 g/dL / ALK PHOS: 56 U/L / ALT: 15 U/L / AST: 15 U/L / GGT: x             PT/INR - ( 19 Apr 2023 17:13 )   PT: 10.6 sec;   INR: 0.91 ratio         PTT - ( 19 Apr 2023 17:13 )  PTT:26.1 sec                    Radiology:  ACC: [id number] EXAM:  CT ANGIO NECK STROKE PROTCL IC   ORDERED BY: MARÍA HANEY     ACC: [id number] EXAM:  CT ANGIO BRAIN STROKE PROTC IC   ORDERED BY: MARÍA HANEY     ACC: [id number] EXAM:  CT BRAIN PERFUSION MAPS STROKE   ORDERED BY: MARÍA HANEY     PROCEDURE DATE:  04/19/2023          INTERPRETATION:  HISTORY:  Dysarthria with interval resolution of symptoms    COMPARISON: None    TECHNIQUE: CT angiogram of the neck and brain was performed after   administration of intravenous contrast. MIP and 3D reconstructions were   performed.    Perfusion CT through the brain obtained during separate bolus injection   of intravenous contrast.  Processing of the CT perfusion data,   specifically, maps of mean transit time, cerebral blood flow and cerebral   blood volume were generated using [employer] RAPID.    Total of 50 cc Omnipaque 300 intravenous contrast were administered   without complication. 50 cc intravenous contrast discarded.    FINDINGS:    CT PERFUSION:    CBF <30%: 0 ml  Tmax > 6s: 0 ml  Mismatch volume=  0 ml    At the imaged levels, normal mean transit time, cerebral blood flow, and   cerebral blood volume are demonstrated bilaterally. There is no evidence   of irreversible, potentially reversible, or compensated perfusion   abnormality within these portions of the brain.    CTA BRAIN    INTERNAL CAROTID ARTERIES:  The intracranial segments of the ICA are   patent without hemodynamically significant stenosis, occlusion, or   aneurysm. The ICA bifurcations are unremarkable.    ANTERIOR CEREBRAL ARTERIES: No flow-limiting stenosis or occlusion.   Anterior communicating artery is unremarkable without aneurysm.    MIDDLE CEREBRAL ARTERIES: No flow-limiting stenosis or occlusion. MCA   bifurcations are unremarkablewithout aneurysm.    POSTERIOR CEREBRAL ARTERIES: No flow-limiting stenosis or occlusion.   Posterior communicating arteries are not well seen bilaterally.    VERTEBROBASILAR SYSTEM: No flow-limiting stenosis or occlusion. Basilar   tip is unremarkable. Left dominant vertebral artery.    CTA NECK    RIGHT CAROTID SYSTEM: Normal in course and caliber without flow-limiting   stenosis or occlusion.    LEFT CAROTID SYSTEM: Normal in course and caliber without flow-limiting   stenosis or occlusion.    VERTEBRAL SYSTEM:  Normal in course and caliber without flow-limiting   stenosis or occlusion. Origin of the vertebral arteries are unremarkable.    AORTIC ARCH: Origin of the great vessels are unremarkable.    IMPRESSION:    CT PERFUSION: Normal perfusion..    CTA BRAIN: Patent intracranial circulation. No flow-limiting stenosis or   occlusion.    CTA NECK: Patent cervical vasculature. No flow limiting stenosis or   occlusion.    --- End of Report ---          JEANNE BECKHAM DO; Resident Radiologist  This document has been electronically signed.  IVONE RED MD; Attending Radiologist  This document has been electronically signed. Apr 19 2023  5:52PM     Neurology - Consult Note    -  Spectra: [phone] ([facility]), [phone] ([facility])  -    HPI: Patient KAYLA LOPEZ is a 67y (1955) woman with a PMHx significant for migraine headaches (on Fiorcet), HTN (on ibesartan), depression (on citalopram), mitral valve prolapse (on atenolol), back/shoulder pain (recently started on Lyrica), who presents as code stroke. LKW at 2pm today. She woke-up at 2:30 pm, and had 15 minutes of aphasia. She could comprehend what her  was saying, but her speech was not making sense. She also could not read letters on her iPhone. As her  was driving her to the ED, her speech started to improve and by the end of neurological assessment the NIHSS was 0. She also endorses chronic migraines (20+ years) w/ R eye visual auras (silver lightning bolts) and L-sided headache. She states her headaches have worsened in the past month following an epidural pain procedure. The pain is quickly aborted w/ Fiorcet. Pain is associated w/ photophobia, but no nausea/vomiting. No family history of headaches. Mother had TIA, breast cancer, and lung cancer. She follows w/ cardiologist for MVP and states that she was recently seen by him in clinic. Lab results at that time were remarkable for elevated cholesterol, but they elected to pursue lifestyle changes and re-assess need for medication during follow-up visit in a few months. She reports negative stress test and TTE about 1 year ago. She reports intermittent paresthesias in L arm and intermittent numbness in b/l hands. The numbness lasts a few minutes, usually occurs when she sleeps, and is better if she shakes her hands out.      Review of Systems:    CONSTITUTIONAL: No fevers or chills; +chronic headaches  EYES AND ENT: +visual auras  NECK: No pain or stiffness  RESPIRATORY: No hemoptysis or shortness of breath  CARDIOVASCULAR: No chest pain or palpitations  GASTROINTESTINAL: No melena or hematochezia  GENITOURINARY: No dysuria or hematuria  NEUROLOGICAL: +As stated in HPI above;   SKIN: No itching, burning, rashes, or lesions   All other review of systems is negative unless indicated above.    Allergies:  penicillin (Unknown)      PMHx/PSHx/Family Hx: As above, otherwise see below   Migraine    Mitral Valve Prolapse    Menopause    IBS (Irritable Bowel Syndrome)    Rheumatoid Arthritis        Social Hx:  No current use of tobacco, alcohol, or illicit drugs      Medications:  MEDICATIONS  (STANDING):    MEDICATIONS  (PRN):      Vitals:  T(C): 36.7 (04-19-23 @ 17:24), Max: 36.7 (04-19-23 @ 16:42)  HR: 86 (04-19-23 @ 17:24) (86 - 93)  BP: 153/98 (04-19-23 @ 17:24) (153/98 - 174/99)  RR: 18 (04-19-23 @ 17:24) (18 - 20)  SpO2: 97% (04-19-23 @ 17:24) (97% - 98%)    Physical Examination: INCOMPLETE  General - NAD, well-developed female, talkative and interactive  Cardiovascular - Peripheral pulses palpable, no edema  Eyes - Fundoscopy not performed due to safety precautions in the setting of the COVID-19 pandemic    Neurologic Exam:  Mental status - Awake, Alert, Oriented to person, place, and time. Speech fluent, repetition and naming intact. Follows simple and complex commands. Attention/concentration, recent and remote memory (including registration and recall), and fund of knowledge intact    Cranial nerves - PERRLA, VFF, EOMI, face sensation (V1-V3) intact b/l, facial strength intact without asymmetry b/l, hearing intact b/l, palate with symmetric elevation, trapezius 5/5 strength b/l, tongue midline on protrusion with full lateral movement    Motor - Normal bulk and tone throughout. No pronator drift.  Strength testing            Deltoid      Biceps      Triceps     Wrist Extension    Wrist Flexion     Interossei         R            5                 5               5                     5                              5                        5                 5  L             5                 5               5                     5                              5                        5                 5              Hip Flexion    Hip Extension    Knee Flexion    Knee Extension    Dorsiflexion    Plantar Flexion  R              5                           5                       5                           5                            5                          5  L              5                           5                        5                           5                            5                          5    Sensation - Light touch intact throughout. No extinction to DSS    DTR's - deferred due to acute symptoms and need for urgent imaging    Coordination - Finger to Nose intact b/l. No tremors appreciated    Gait and station - Normal casual gait. Ambulating unassisted.    Labs:                        14.3   7.38  )-----------( 268      ( 19 Apr 2023 17:13 )             41.1     04-19    139  |  103  |  22  ----------------------------<  121<H>  3.7   |  24  |  0.60    Ca    10.4      19 Apr 2023 17:13    TPro  7.4  /  Alb  4.6  /  TBili  0.2  /  DBili  x   /  AST  15  /  ALT  15  /  AlkPhos  56  04-19    CAPILLARY BLOOD GLUCOSE  115 (19 Apr 2023 17:00)      POCT Blood Glucose.: 115 mg/dL (19 Apr 2023 16:44)    LIVER FUNCTIONS - ( 19 Apr 2023 17:13 )  Alb: 4.6 g/dL / Pro: 7.4 g/dL / ALK PHOS: 56 U/L / ALT: 15 U/L / AST: 15 U/L / GGT: x             PT/INR - ( 19 Apr 2023 17:13 )   PT: 10.6 sec;   INR: 0.91 ratio         PTT - ( 19 Apr 2023 17:13 )  PTT:26.1 sec                    Radiology:  ACC: [id number] EXAM:  CT ANGIO NECK STROKE PROTCL IC   ORDERED BY: MARÍA HANEY     ACC: [id number] EXAM:  CT ANGIO BRAIN STROKE PROTC IC   ORDERED BY: MARÍA HANEY     ACC: [id number] EXAM:  CT BRAIN PERFUSION MAPS STROKE   ORDERED BY: MARÍA HANEY     PROCEDURE DATE:  04/19/2023          INTERPRETATION:  HISTORY:  Dysarthria with interval resolution of symptoms    COMPARISON: None    TECHNIQUE: CT angiogram of the neck and brain was performed after   administration of intravenous contrast. MIP and 3D reconstructions were   performed.    Perfusion CT through the brain obtained during separate bolus injection   of intravenous contrast.  Processing of the CT perfusion data,   specifically, maps of mean transit time, cerebral blood flow and cerebral   blood volume were generated using [employer] RAPID.    Total of 50 cc Omnipaque 300 intravenous contrast were administered   without complication. 50 cc intravenous contrast discarded.    FINDINGS:    CT PERFUSION:    CBF <30%: 0 ml  Tmax > 6s: 0 ml  Mismatch volume=  0 ml    At the imaged levels, normal mean transit time, cerebral blood flow, and   cerebral blood volume are demonstrated bilaterally. There is no evidence   of irreversible, potentially reversible, or compensated perfusion   abnormality within these portions of the brain.    CTA BRAIN    INTERNAL CAROTID ARTERIES:  The intracranial segments of the ICA are   patent without hemodynamically significant stenosis, occlusion, or   aneurysm. The ICA bifurcations are unremarkable.    ANTERIOR CEREBRAL ARTERIES: No flow-limiting stenosis or occlusion.   Anterior communicating artery is unremarkable without aneurysm.    MIDDLE CEREBRAL ARTERIES: No flow-limiting stenosis or occlusion. MCA   bifurcations are unremarkablewithout aneurysm.    POSTERIOR CEREBRAL ARTERIES: No flow-limiting stenosis or occlusion.   Posterior communicating arteries are not well seen bilaterally.    VERTEBROBASILAR SYSTEM: No flow-limiting stenosis or occlusion. Basilar   tip is unremarkable. Left dominant vertebral artery.    CTA NECK    RIGHT CAROTID SYSTEM: Normal in course and caliber without flow-limiting   stenosis or occlusion.    LEFT CAROTID SYSTEM: Normal in course and caliber without flow-limiting   stenosis or occlusion.    VERTEBRAL SYSTEM:  Normal in course and caliber without flow-limiting   stenosis or occlusion. Origin of the vertebral arteries are unremarkable.    AORTIC ARCH: Origin of the great vessels are unremarkable.    IMPRESSION:    CT PERFUSION: Normal perfusion..    CTA BRAIN: Patent intracranial circulation. No flow-limiting stenosis or   occlusion.    CTA NECK: Patent cervical vasculature. No flow limiting stenosis or   occlusion.    --- End of Report ---          JEANNE BECKHAM DO; Resident Radiologist  This document has been electronically signed.  IVONE RED MD; Attending Radiologist  This document has been electronically signed. Apr 19 2023  5:52PM     Neurology - Consult Note    -  Spectra: [phone] ([facility]), [phone] ([facility])  -    HPI: Patient KAYLA LOPEZ is a 67y (1955) woman with a PMHx significant for migraine headaches (on Fiorcet), HTN (on ibesartan), depression (on citalopram), mitral valve prolapse (on atenolol), back/shoulder pain (recently started on Lyrica), who presents as code stroke. LKW at 2pm today. She woke-up at 2:30 pm, and had 15 minutes of aphasia. She could comprehend what her  was saying, but her speech was not making sense. She also could not read letters on her iPhone. As her  was driving her to the ED, her speech started to improve and by the end of neurological assessment the NIHSS was 0. She also endorses chronic migraines (20+ years) w/ R eye visual auras (silver lightning bolts) and L-sided headache. She states her headaches have worsened in the past month following an epidural pain procedure. The pain is quickly aborted w/ Fiorcet. Pain is associated w/ photophobia, but no nausea/vomiting. No family history of headaches. Mother had TIA, breast cancer, and lung cancer. She follows w/ cardiologist for MVP and states that she was recently seen by him in clinic. Lab results at that time were remarkable for elevated cholesterol, but they elected to pursue lifestyle changes and re-assess need for medication during follow-up visit in a few months. She reports negative stress test and TTE about 1 year ago. She reports intermittent paresthesias in L arm and intermittent numbness in b/l hands. The numbness lasts a few minutes, usually occurs when she sleeps, and is better if she shakes her hands out.      Review of Systems:    CONSTITUTIONAL: No fevers or chills; +chronic headaches  EYES AND ENT: +visual auras  NECK: No pain or stiffness  RESPIRATORY: No hemoptysis or shortness of breath  CARDIOVASCULAR: No chest pain or palpitations  GASTROINTESTINAL: No melena or hematochezia  GENITOURINARY: No dysuria or hematuria  NEUROLOGICAL: +As stated in HPI above;   SKIN: No itching, burning, rashes, or lesions   All other review of systems is negative unless indicated above.    Allergies:  penicillin (Unknown)      PMHx/PSHx/Family Hx: As above, otherwise see below   Migraine    Mitral Valve Prolapse    Menopause    IBS (Irritable Bowel Syndrome)    Rheumatoid Arthritis        Social Hx:  No current use of tobacco, alcohol, or illicit drugs      Medications:  MEDICATIONS  (STANDING):    MEDICATIONS  (PRN):      Vitals:  T(C): 36.7 (04-19-23 @ 17:24), Max: 36.7 (04-19-23 @ 16:42)  HR: 86 (04-19-23 @ 17:24) (86 - 93)  BP: 153/98 (04-19-23 @ 17:24) (153/98 - 174/99)  RR: 18 (04-19-23 @ 17:24) (18 - 20)  SpO2: 97% (04-19-23 @ 17:24) (97% - 98%)    Physical Examination:   General - NAD, well-developed female, talkative and interactive  Cardiovascular - Peripheral pulses palpable, no edema  Eyes - Fundoscopy not performed due to safety precautions in the setting of the COVID-19 pandemic    Neurologic Exam:  Mental status - Awake, Alert, Oriented to person, place, and time. Speech fluent, repetition and naming intact. Follows simple and complex commands. Attention/concentration, recent and remote memory (including registration and recall), and fund of knowledge intact    Cranial nerves - PERRLA, VFF, EOMI, face sensation (V1-V3) intact b/l, facial strength intact without asymmetry b/l, hearing intact b/l, palate with symmetric elevation, trapezius 5/5 strength b/l, tongue midline on protrusion with full lateral movement    Motor - Normal bulk and tone throughout. No pronator drift.  Strength testing            Deltoid      Biceps      Triceps     Wrist Extension    Wrist Flexion     Interossei         R            5                 5               5                     5                              5                        5                 5  L             5                 5               5                     5                              5                        5                 5              Hip Flexion    Hip Extension    Knee Flexion    Knee Extension    Dorsiflexion    Plantar Flexion  R              5                           5                       5                           5                            5                          5  L              5                           5                        5                           5                            5                          5    Sensation - Light touch intact throughout. No extinction to DSS    DTR's - deferred due to acute symptoms and need for urgent imaging    Coordination - Finger to Nose intact b/l. No tremors appreciated    Gait and station - Normal casual gait. Ambulating unassisted.    Labs:                        14.3   7.38  )-----------( 268      ( 19 Apr 2023 17:13 )             41.1     04-19    139  |  103  |  22  ----------------------------<  121<H>  3.7   |  24  |  0.60    Ca    10.4      19 Apr 2023 17:13    TPro  7.4  /  Alb  4.6  /  TBili  0.2  /  DBili  x   /  AST  15  /  ALT  15  /  AlkPhos  56  04-19    CAPILLARY BLOOD GLUCOSE  115 (19 Apr 2023 17:00)      POCT Blood Glucose.: 115 mg/dL (19 Apr 2023 16:44)    LIVER FUNCTIONS - ( 19 Apr 2023 17:13 )  Alb: 4.6 g/dL / Pro: 7.4 g/dL / ALK PHOS: 56 U/L / ALT: 15 U/L / AST: 15 U/L / GGT: x             PT/INR - ( 19 Apr 2023 17:13 )   PT: 10.6 sec;   INR: 0.91 ratio         PTT - ( 19 Apr 2023 17:13 )  PTT:26.1 sec                    Radiology:  ACC: [id number] EXAM:  CT ANGIO NECK STROKE PROTCL IC   ORDERED BY: MARÍA HANEY     ACC: [id number] EXAM:  CT ANGIO BRAIN STROKE PROTC IC   ORDERED BY: MARÍA HANEY     ACC: [id number] EXAM:  CT BRAIN PERFUSION MAPS STROKE   ORDERED BY: MARÍA HANEY     PROCEDURE DATE:  04/19/2023          INTERPRETATION:  HISTORY:  Dysarthria with interval resolution of symptoms    COMPARISON: None    TECHNIQUE: CT angiogram of the neck and brain was performed after   administration of intravenous contrast. MIP and 3D reconstructions were   performed.    Perfusion CT through the brain obtained during separate bolus injection   of intravenous contrast.  Processing of the CT perfusion data,   specifically, maps of mean transit time, cerebral blood flow and cerebral   blood volume were generated using [employer] RAPID.    Total of 50 cc Omnipaque 300 intravenous contrast were administered   without complication. 50 cc intravenous contrast discarded.    FINDINGS:    CT PERFUSION:    CBF <30%: 0 ml  Tmax > 6s: 0 ml  Mismatch volume=  0 ml    At the imaged levels, normal mean transit time, cerebral blood flow, and   cerebral blood volume are demonstrated bilaterally. There is no evidence   of irreversible, potentially reversible, or compensated perfusion   abnormality within these portions of the brain.    CTA BRAIN    INTERNAL CAROTID ARTERIES:  The intracranial segments of the ICA are   patent without hemodynamically significant stenosis, occlusion, or   aneurysm. The ICA bifurcations are unremarkable.    ANTERIOR CEREBRAL ARTERIES: No flow-limiting stenosis or occlusion.   Anterior communicating artery is unremarkable without aneurysm.    MIDDLE CEREBRAL ARTERIES: No flow-limiting stenosis or occlusion. MCA   bifurcations are unremarkablewithout aneurysm.    POSTERIOR CEREBRAL ARTERIES: No flow-limiting stenosis or occlusion.   Posterior communicating arteries are not well seen bilaterally.    VERTEBROBASILAR SYSTEM: No flow-limiting stenosis or occlusion. Basilar   tip is unremarkable. Left dominant vertebral artery.    CTA NECK    RIGHT CAROTID SYSTEM: Normal in course and caliber without flow-limiting   stenosis or occlusion.    LEFT CAROTID SYSTEM: Normal in course and caliber without flow-limiting   stenosis or occlusion.    VERTEBRAL SYSTEM:  Normal in course and caliber without flow-limiting   stenosis or occlusion. Origin of the vertebral arteries are unremarkable.    AORTIC ARCH: Origin of the great vessels are unremarkable.    IMPRESSION:    CT PERFUSION: Normal perfusion..    CTA BRAIN: Patent intracranial circulation. No flow-limiting stenosis or   occlusion.    CTA NECK: Patent cervical vasculature. No flow limiting stenosis or   occlusion.    --- End of Report ---          JEANNE BECKHAM DO; Resident Radiologist  This document has been electronically signed.  IVONE RED MD; Attending Radiologist  This document has been electronically signed. Apr 19 2023  5:52PM

## 2023-04-19 NOTE — ED PROVIDER NOTE - CLINICAL SUMMARY MEDICAL DECISION MAKING FREE TEXT BOX
Attending note (Venkatesh): 68y/o F presenting after episode of difficulty reading and speaking, resolved after 15 minutes though reporting that she doesn't feel "right;" on exam has no focal neurologic deficits; is speaking clearly/logically; hypertensive but not tachycardic, hypoxic or febrile; concern for possible TIA/small CVA; NIH SS approx 0; code stroke called by triage and patient's initial evaluation in CT scan suite with concurrent exam by neuro/stroke team, as per hospital policy; obtaining CT head, CTA-head/neck and CT-brain/perfusion; obtain screening labs; disposition pending review of labs/imaging; evaluate for metabolic derangements, evidence of infection (none on initial exam/afebrile) vs central neurologic process (ischemic/hemorrhagic stroke, TIA, mass/mass effect).

## 2023-04-19 NOTE — ED PROVIDER NOTE - CARE PROVIDER_API CALL
Ga Grimes)  Neurology; Vascular Neurology  [address] [facility] [facility] [address], [address]  [place] [address]  Phone: [phone]  Fax: [phone]  Follow Up Time:

## 2023-04-19 NOTE — ED PROVIDER NOTE - PROGRESS NOTE DETAILS
Attending note (Venkatesh): symptoms resolved and case discussed with neurology team; plan after review of labs/imaging as per neuro recommendations for outpatient f/u; offered cdu but patient declined closed mri w/o general anesthesia; neuro to set up with close interval outpatient f/u and open mri.

## 2023-04-27 ENCOUNTER — APPOINTMENT (OUTPATIENT)
Dept: ORTHOPEDIC SURGERY | Facility: CLINIC | Age: 68
End: 2023-04-27

## 2023-05-01 NOTE — STROKE CODE NOTE - NSSTROKEABCD2SCORE
[Current] : current [Never] : never [TextBox_4] : 68y/o female    born   in [facility] ( 18-  one pack day  now on and off  7-9 cig day)   work   hairstyles\par \par -first noted   coughing   due to allergy    spring and fall  x 10 years\par -  dog down stairs\par -  phlegm  stuck in throat  treated for   nasal sprays    gerd \par -also given albuterol MDI     and feels a little better \par \par -never  had covid  moderna vaccine     flu shot  \par \par 5/1/2023\par 68y/o  female born in [facility]   current smoker  7-9  cig/day   ( 18- one pack day  now on and off )  work hairstyles \par - doing well\par -ct reviewed\par - saline n vic spray   doing  well\par - cough  improved \par -    phlegm clear   trigger humid  weather \par \par  3

## 2023-05-12 ENCOUNTER — APPOINTMENT (OUTPATIENT)
Dept: OPHTHALMOLOGY | Facility: CLINIC | Age: 68
End: 2023-05-12
Payer: MEDICARE

## 2023-05-12 ENCOUNTER — NON-APPOINTMENT (OUTPATIENT)
Age: 68
End: 2023-05-12

## 2023-05-12 PROCEDURE — 92014 COMPRE OPH EXAM EST PT 1/>: CPT

## 2023-05-25 ENCOUNTER — APPOINTMENT (OUTPATIENT)
Dept: OPHTHALMOLOGY | Facility: CLINIC | Age: 68
End: 2023-05-25
Payer: MEDICARE

## 2023-05-25 ENCOUNTER — NON-APPOINTMENT (OUTPATIENT)
Age: 68
End: 2023-05-25

## 2023-05-25 PROCEDURE — 92083 EXTENDED VISUAL FIELD XM: CPT

## 2023-05-25 PROCEDURE — 92014 COMPRE OPH EXAM EST PT 1/>: CPT

## 2023-07-18 ENCOUNTER — APPOINTMENT (OUTPATIENT)
Dept: PULMONOLOGY | Facility: CLINIC | Age: 68
End: 2023-07-18
Payer: MEDICARE

## 2023-07-18 ENCOUNTER — RESULT REVIEW (OUTPATIENT)
Age: 68
End: 2023-07-18

## 2023-07-18 ENCOUNTER — APPOINTMENT (OUTPATIENT)
Dept: RADIOLOGY | Facility: IMAGING CENTER | Age: 68
End: 2023-07-18
Payer: MEDICARE

## 2023-07-18 ENCOUNTER — OUTPATIENT (OUTPATIENT)
Dept: OUTPATIENT SERVICES | Facility: HOSPITAL | Age: 68
LOS: 1 days | End: 2023-07-18
Payer: MEDICARE

## 2023-07-18 VITALS — SYSTOLIC BLOOD PRESSURE: 162 MMHG | DIASTOLIC BLOOD PRESSURE: 94 MMHG | HEART RATE: 80 BPM | OXYGEN SATURATION: 96 %

## 2023-07-18 DIAGNOSIS — R05.9 COUGH, UNSPECIFIED: ICD-10-CM

## 2023-07-18 DIAGNOSIS — J45.909 UNSPECIFIED ASTHMA, UNCOMPLICATED: ICD-10-CM

## 2023-07-18 PROCEDURE — 99213 OFFICE O/P EST LOW 20 MIN: CPT

## 2023-07-18 PROCEDURE — 71046 X-RAY EXAM CHEST 2 VIEWS: CPT | Mod: 26

## 2023-07-18 PROCEDURE — 71046 X-RAY EXAM CHEST 2 VIEWS: CPT

## 2023-07-18 RX ORDER — PREDNISONE 10 MG/1
10 TABLET ORAL
Qty: 18 | Refills: 0 | Status: ACTIVE | COMMUNITY
Start: 2023-07-18 | End: 1900-01-01

## 2023-07-18 NOTE — HISTORY OF PRESENT ILLNESS
[TextBox_4] : asthma attack last week\par uc put on amox, prednisone x 5 days\par still coughing\par

## 2023-07-18 NOTE — PROCEDURE
[FreeTextEntry1] : Prior exams reviewed:\par \par nereida: normal\par \par \par \par \par \par EXAM: CT CHEST\par \par \par PROCEDURE DATE: 02/09/2021\par \par \par \par INTERPRETATION: Clinical information: Pulmonary nodule. Exam is compared to previous study of 8/12/2011.\par \par CT scan of the chest was obtained without administration of intravenous contrast.\par \par No hilar and/or mediastinal adenopathy is noted.\par \par Heart is normal in size. Calcification the coronary arteries noted. No pericardial effusion is noted.\par \par No endobronchial lesions are noted. Calcified nodules are noted in the left upper and left lower lobes. They are unchanged when compared to previous exam. Remaining lungs are clear. No pleural effusions are noted.\par \par Below the diaphragm, visualized portions of the abdomen are unremarkable.\par \par Degenerative changes of the spine are noted.\par \par Impression: No noncalcified pulmonary nodules are noted.\par \par

## 2023-07-25 RX ORDER — ALBUTEROL SULFATE 90 UG/1
108 (90 BASE) INHALANT RESPIRATORY (INHALATION)
Qty: 1 | Refills: 2 | Status: ACTIVE | COMMUNITY
Start: 2023-07-25 | End: 1900-01-01

## 2023-08-01 ENCOUNTER — APPOINTMENT (OUTPATIENT)
Dept: MAMMOGRAPHY | Facility: CLINIC | Age: 68
End: 2023-08-01
Payer: MEDICARE

## 2023-08-01 ENCOUNTER — APPOINTMENT (OUTPATIENT)
Dept: ULTRASOUND IMAGING | Facility: CLINIC | Age: 68
End: 2023-08-01
Payer: MEDICARE

## 2023-08-01 PROCEDURE — 77067 SCR MAMMO BI INCL CAD: CPT

## 2023-08-01 PROCEDURE — 77063 BREAST TOMOSYNTHESIS BI: CPT

## 2023-08-01 PROCEDURE — 76641 ULTRASOUND BREAST COMPLETE: CPT | Mod: 50,GY

## 2023-08-13 ENCOUNTER — EMERGENCY (EMERGENCY)
Facility: HOSPITAL | Age: 68
LOS: 1 days | Discharge: HOME | End: 2023-08-13
Attending: EMERGENCY MEDICINE
Payer: MEDICARE

## 2023-08-13 VITALS
SYSTOLIC BLOOD PRESSURE: 122 MMHG | RESPIRATION RATE: 18 BRPM | HEART RATE: 66 BPM | DIASTOLIC BLOOD PRESSURE: 79 MMHG | TEMPERATURE: 98 F | OXYGEN SATURATION: 98 %

## 2023-08-13 VITALS
TEMPERATURE: 98 F | HEIGHT: 65 IN | SYSTOLIC BLOOD PRESSURE: 183 MMHG | RESPIRATION RATE: 18 BRPM | WEIGHT: 164.91 LBS | DIASTOLIC BLOOD PRESSURE: 91 MMHG | HEART RATE: 85 BPM | OXYGEN SATURATION: 97 %

## 2023-08-13 LAB
ALBUMIN SERPL ELPH-MCNC: 4.1 G/DL — SIGNIFICANT CHANGE UP (ref 3.3–5)
ALP SERPL-CCNC: 66 U/L — SIGNIFICANT CHANGE UP (ref 40–120)
ALT FLD-CCNC: 14 U/L — SIGNIFICANT CHANGE UP (ref 10–45)
ANION GAP SERPL CALC-SCNC: 13 MMOL/L — SIGNIFICANT CHANGE UP (ref 5–17)
APTT BLD: 26.6 SEC — SIGNIFICANT CHANGE UP (ref 24.5–35.6)
AST SERPL-CCNC: 17 U/L — SIGNIFICANT CHANGE UP (ref 10–40)
BILIRUB SERPL-MCNC: 0.1 MG/DL — LOW (ref 0.2–1.2)
BUN SERPL-MCNC: 18 MG/DL — SIGNIFICANT CHANGE UP (ref 7–23)
CALCIUM SERPL-MCNC: 9.4 MG/DL — SIGNIFICANT CHANGE UP (ref 8.4–10.5)
CHLORIDE SERPL-SCNC: 106 MMOL/L — SIGNIFICANT CHANGE UP (ref 96–108)
CO2 SERPL-SCNC: 20 MMOL/L — LOW (ref 22–31)
CREAT SERPL-MCNC: 0.52 MG/DL — SIGNIFICANT CHANGE UP (ref 0.5–1.3)
EGFR: 102 ML/MIN/1.73M2 — SIGNIFICANT CHANGE UP
GLUCOSE SERPL-MCNC: 110 MG/DL — HIGH (ref 70–99)
HCT VFR BLD CALC: 37.9 % — SIGNIFICANT CHANGE UP (ref 34.5–45)
HGB BLD-MCNC: 13 G/DL — SIGNIFICANT CHANGE UP (ref 11.5–15.5)
INR BLD: 0.93 RATIO — SIGNIFICANT CHANGE UP (ref 0.85–1.18)
MCHC RBC-ENTMCNC: 31.4 PG — SIGNIFICANT CHANGE UP (ref 27–34)
MCHC RBC-ENTMCNC: 34.3 GM/DL — SIGNIFICANT CHANGE UP (ref 32–36)
MCV RBC AUTO: 91.5 FL — SIGNIFICANT CHANGE UP (ref 80–100)
NRBC # BLD: 0 /100 WBCS — SIGNIFICANT CHANGE UP (ref 0–0)
PLATELET # BLD AUTO: 239 K/UL — SIGNIFICANT CHANGE UP (ref 150–400)
POTASSIUM SERPL-MCNC: 4 MMOL/L — SIGNIFICANT CHANGE UP (ref 3.5–5.3)
POTASSIUM SERPL-SCNC: 4 MMOL/L — SIGNIFICANT CHANGE UP (ref 3.5–5.3)
PROT SERPL-MCNC: 6.7 G/DL — SIGNIFICANT CHANGE UP (ref 6–8.3)
PROTHROM AB SERPL-ACNC: 10.3 SEC — SIGNIFICANT CHANGE UP (ref 9.5–13)
RBC # BLD: 4.14 M/UL — SIGNIFICANT CHANGE UP (ref 3.8–5.2)
RBC # FLD: 12.9 % — SIGNIFICANT CHANGE UP (ref 10.3–14.5)
SODIUM SERPL-SCNC: 139 MMOL/L — SIGNIFICANT CHANGE UP (ref 135–145)
WBC # BLD: 5.78 K/UL — SIGNIFICANT CHANGE UP (ref 3.8–10.5)
WBC # FLD AUTO: 5.78 K/UL — SIGNIFICANT CHANGE UP (ref 3.8–10.5)

## 2023-08-13 PROCEDURE — 73060 X-RAY EXAM OF HUMERUS: CPT

## 2023-08-13 PROCEDURE — 85610 PROTHROMBIN TIME: CPT

## 2023-08-13 PROCEDURE — 73060 X-RAY EXAM OF HUMERUS: CPT | Mod: 26,RT

## 2023-08-13 PROCEDURE — 71045 X-RAY EXAM CHEST 1 VIEW: CPT

## 2023-08-13 PROCEDURE — 85730 THROMBOPLASTIN TIME PARTIAL: CPT

## 2023-08-13 PROCEDURE — 99284 EMERGENCY DEPT VISIT MOD MDM: CPT | Mod: FS

## 2023-08-13 PROCEDURE — 80053 COMPREHEN METABOLIC PANEL: CPT

## 2023-08-13 PROCEDURE — 73030 X-RAY EXAM OF SHOULDER: CPT | Mod: 26,RT

## 2023-08-13 PROCEDURE — 71045 X-RAY EXAM CHEST 1 VIEW: CPT | Mod: 26

## 2023-08-13 PROCEDURE — 73030 X-RAY EXAM OF SHOULDER: CPT

## 2023-08-13 PROCEDURE — 85027 COMPLETE CBC AUTOMATED: CPT

## 2023-08-13 PROCEDURE — 99284 EMERGENCY DEPT VISIT MOD MDM: CPT | Mod: 25
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
## 2023-08-13 NOTE — ED PROVIDER NOTE - OBJECTIVE STATEMENT
67yo female pt with PMHx of HTN, Asthma, on ASA 81mg, and s/p left shoulder replacement (10/2022 in [facility]) presents to ED with right upper arm bruising and soreness today. Reports she's had right neck pain with radiating pain to arm for few days and had PT last Friday. She noticed the pain improved significantly (1/10) today but noticed bruising on right upper arm. Denies fall or recent injuries. Denies sensory changes or weakness to extremities. Denies fever, chills, or recent cold symptoms. Denies CP/SOB/ABD pain or N/V/D. Denies shoulder pain.

## 2023-08-13 NOTE — ED ADULT TRIAGE NOTE - BP NONINVASIVE DIASTOLIC (MM HG)
91
GENERAL: NAD  HEAD:  Atraumatic, Normocephalic  CHEST/LUNG: Clear to auscultation bilaterally  HEART: Normal S1/S2  PSYCH: AAOx3  NEUROLOGY: non-focal  SKIN: No obvious rashes or lesions.

## 2023-08-13 NOTE — ED ADULT NURSE NOTE - OBJECTIVE STATEMENT
68y female PMH HTN, migraines, right shoulder replacement in oct 2022 to the ED from home with right arm bruising. Pt reports that pt began having a sharp sensation in the right arm which limited pt range of motion and states  that pt could not lift arm over head. Pt reports the pain has improved but now pt began developing bruising under the arm to the armpit. No injuries to the area or strenuous activity but pt is in PT for back pain. Stretcher in lowest position and locked, appropriate side rails in place, room cleared of clutter and safety hazards, blankets given for comfort

## 2023-08-13 NOTE — ED PROVIDER NOTE - PHYSICAL EXAMINATION
NAD. Hypertensive. Afebrile. Neck supple. Lungs clear. No spinal midline tender. +Right lower para cervical tender. Full ROMs of shoulder without tender. +Ecchymosis on medial humerus with mild tender. No obvious swelling. N/V- intact.
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
## 2023-08-13 NOTE — ED PROVIDER NOTE - NSFOLLOWUPINSTRUCTIONS_ED_ALL_ED_FT
Keep continue your current medications as prescribed.    Take Tylenol (2 tablets of 500mg every 8hours) as needed for pain.    Follow up with your Dr. for reevaluation, call Monday for appointment.    Return for any concerns, fever, swelling, weakness, numbness, or worsening pain. Keep continue your current medications as prescribed.    Take Tylenol (2 tablets of 500mg every 8hours) as needed for pain.    Follow up with your Dr. and surgeon for reevaluation, call Monday for appointment.    Return for any concerns, fever, swelling, weakness, numbness, or worsening pain.

## 2023-08-13 NOTE — ED PROVIDER NOTE - CLINICAL SUMMARY MEDICAL DECISION MAKING FREE TEXT BOX
Adult female pw c/o pain rue and brusing past 4d, Pain has improved to point it's almost gone. Bruising in dependant areas has worsened. Only possible trauma is recent physical therapy. Will check labs ro  coagulapahty but pt has no gen bruising and check xr rue/chest. If neg fu ortho as opt  Kal Stout MD, Facep

## 2023-08-13 NOTE — ED PROVIDER NOTE - NS ED ATTENDING STATEMENT MOD
Attending with This was a shared visit with the TONIA. I reviewed and verified the documentation and independently performed the documented:

## 2023-08-13 NOTE — ED ADULT NURSE NOTE - NSFALLUNIVINTERV_ED_ALL_ED
Bed/Stretcher in lowest position, wheels locked, appropriate side rails in place/Call bell, personal items and telephone in reach/Instruct patient to call for assistance before getting out of bed/chair/stretcher/Non-slip footwear applied when patient is off stretcher/[place] to call system/Physically safe environment - no spills, clutter or unnecessary equipment/Purposeful proactive rounding/Room/bathroom lighting operational, light cord in reach

## 2023-08-13 NOTE — ED PROVIDER NOTE - PATIENT PORTAL LINK FT
You can access the FollowMyHealth Patient Portal offered by [facility] by registering at the following website: [email or web address]. By joining [facility]’s FollowMyHealth portal, you will also be able to view your health information using other applications (apps) compatible with our system.

## 2023-09-14 ENCOUNTER — APPOINTMENT (OUTPATIENT)
Dept: RADIOLOGY | Facility: CLINIC | Age: 68
End: 2023-09-14
Payer: MEDICARE

## 2023-09-14 PROCEDURE — 77080 DXA BONE DENSITY AXIAL: CPT

## 2023-09-18 ENCOUNTER — APPOINTMENT (OUTPATIENT)
Dept: ORTHOPEDIC SURGERY | Facility: CLINIC | Age: 68
End: 2023-09-18
Payer: MEDICARE

## 2023-09-18 VITALS — HEIGHT: 65 IN | BODY MASS INDEX: 27.16 KG/M2 | WEIGHT: 163 LBS

## 2023-09-18 DIAGNOSIS — M43.16 SPONDYLOLISTHESIS, LUMBAR REGION: ICD-10-CM

## 2023-09-18 DIAGNOSIS — M79.10 MYALGIA, UNSPECIFIED SITE: ICD-10-CM

## 2023-09-18 PROCEDURE — 20552 NJX 1/MLT TRIGGER POINT 1/2: CPT

## 2023-09-18 PROCEDURE — 99214 OFFICE O/P EST MOD 30 MIN: CPT | Mod: 25

## 2023-09-18 RX ORDER — METHYLPREDNISOLONE 4 MG/1
4 TABLET ORAL
Qty: 1 | Refills: 1 | Status: ACTIVE | COMMUNITY
Start: 2023-09-18 | End: 1900-01-01

## 2023-11-10 ENCOUNTER — APPOINTMENT (OUTPATIENT)
Dept: ULTRASOUND IMAGING | Facility: CLINIC | Age: 68
End: 2023-11-10
Payer: MEDICARE

## 2023-11-10 PROCEDURE — 76770 US EXAM ABDO BACK WALL COMP: CPT

## 2023-12-26 ENCOUNTER — APPOINTMENT (OUTPATIENT)
Dept: PULMONOLOGY | Facility: CLINIC | Age: 68
End: 2023-12-26
Payer: MEDICARE

## 2023-12-26 VITALS
RESPIRATION RATE: 16 BRPM | SYSTOLIC BLOOD PRESSURE: 147 MMHG | OXYGEN SATURATION: 96 % | HEART RATE: 67 BPM | DIASTOLIC BLOOD PRESSURE: 85 MMHG

## 2023-12-26 DIAGNOSIS — R05.9 COUGH, UNSPECIFIED: ICD-10-CM

## 2023-12-26 PROCEDURE — 71046 X-RAY EXAM CHEST 2 VIEWS: CPT

## 2023-12-26 PROCEDURE — 99213 OFFICE O/P EST LOW 20 MIN: CPT | Mod: 25

## 2023-12-26 NOTE — HISTORY OF PRESENT ILLNESS
[Former] : former [TextBox_4] : cough x 5 weeks ent put her on antibiotics cough now with white sputum instead of green, overall is getting better stopped the breo a while ago bc was doing well no wheeze or sob or fever

## 2023-12-26 NOTE — PROCEDURE
[FreeTextEntry1] : CXR: clear lungs, no focal consolidation or pleural effusions, cardiac silhouette appears normal.  No bony abnormality.  Prior exams reviewed:  nereida: normal      EXAM: CT CHEST   PROCEDURE DATE: 02/09/2021    INTERPRETATION: Clinical information: Pulmonary nodule. Exam is compared to previous study of 8/12/2011.  CT scan of the chest was obtained without administration of intravenous contrast.  No hilar and/or mediastinal adenopathy is noted.  Heart is normal in size. Calcification the coronary arteries noted. No pericardial effusion is noted.  No endobronchial lesions are noted. Calcified nodules are noted in the left upper and left lower lobes. They are unchanged when compared to previous exam. Remaining lungs are clear. No pleural effusions are noted.  Below the diaphragm, visualized portions of the abdomen are unremarkable.  Degenerative changes of the spine are noted.  Impression: No noncalcified pulmonary nodules are noted.

## 2024-08-07 ENCOUNTER — APPOINTMENT (OUTPATIENT)
Dept: MAMMOGRAPHY | Facility: CLINIC | Age: 69
End: 2024-08-07

## 2024-08-07 ENCOUNTER — APPOINTMENT (OUTPATIENT)
Dept: ULTRASOUND IMAGING | Facility: CLINIC | Age: 69
End: 2024-08-07

## 2024-08-07 PROCEDURE — 77067 SCR MAMMO BI INCL CAD: CPT

## 2024-08-07 PROCEDURE — 76641 ULTRASOUND BREAST COMPLETE: CPT | Mod: 50,GY

## 2024-08-07 PROCEDURE — 77063 BREAST TOMOSYNTHESIS BI: CPT

## 2024-10-18 ENCOUNTER — APPOINTMENT (OUTPATIENT)
Dept: OPHTHALMOLOGY | Facility: CLINIC | Age: 69
End: 2024-10-18

## 2025-04-03 ENCOUNTER — NON-APPOINTMENT (OUTPATIENT)
Age: 70
End: 2025-04-03

## 2025-04-03 ENCOUNTER — APPOINTMENT (OUTPATIENT)
Dept: OPHTHALMOLOGY | Facility: CLINIC | Age: 70
End: 2025-04-03

## 2025-04-03 PROCEDURE — 92012 INTRM OPH EXAM EST PATIENT: CPT

## 2025-04-03 PROCEDURE — 92133 CPTRZD OPH DX IMG PST SGM ON: CPT

## 2025-08-07 ENCOUNTER — APPOINTMENT (OUTPATIENT)
Dept: ULTRASOUND IMAGING | Facility: CLINIC | Age: 70
End: 2025-08-07
Payer: MEDICARE

## 2025-08-07 ENCOUNTER — APPOINTMENT (OUTPATIENT)
Dept: MAMMOGRAPHY | Facility: CLINIC | Age: 70
End: 2025-08-07
Payer: MEDICARE

## 2025-08-07 PROCEDURE — 77067 SCR MAMMO BI INCL CAD: CPT

## 2025-08-07 PROCEDURE — 77063 BREAST TOMOSYNTHESIS BI: CPT

## 2025-08-07 PROCEDURE — 76641 ULTRASOUND BREAST COMPLETE: CPT | Mod: 50,GA
